# Patient Record
Sex: MALE | HISPANIC OR LATINO | Employment: FULL TIME | URBAN - METROPOLITAN AREA
[De-identification: names, ages, dates, MRNs, and addresses within clinical notes are randomized per-mention and may not be internally consistent; named-entity substitution may affect disease eponyms.]

---

## 2018-02-26 ENCOUNTER — APPOINTMENT (OUTPATIENT)
Dept: RADIOLOGY | Facility: MEDICAL CENTER | Age: 43
End: 2018-02-26
Attending: EMERGENCY MEDICINE
Payer: COMMERCIAL

## 2018-02-26 ENCOUNTER — RESOLUTE PROFESSIONAL BILLING HOSPITAL PROF FEE (OUTPATIENT)
Dept: HOSPITALIST | Facility: MEDICAL CENTER | Age: 43
End: 2018-02-26
Payer: COMMERCIAL

## 2018-02-26 ENCOUNTER — HOSPITAL ENCOUNTER (OUTPATIENT)
Facility: MEDICAL CENTER | Age: 43
End: 2018-02-27
Attending: EMERGENCY MEDICINE | Admitting: HOSPITALIST
Payer: COMMERCIAL

## 2018-02-26 DIAGNOSIS — I47.10 SVT (SUPRAVENTRICULAR TACHYCARDIA): ICD-10-CM

## 2018-02-26 DIAGNOSIS — R00.0 SINUS TACHYCARDIA: ICD-10-CM

## 2018-02-26 PROBLEM — R79.89 ELEVATED BRAIN NATRIURETIC PEPTIDE (BNP) LEVEL: Status: ACTIVE | Noted: 2018-02-26

## 2018-02-26 PROBLEM — R73.9 HYPERGLYCEMIA: Status: ACTIVE | Noted: 2018-02-26

## 2018-02-26 LAB
ALBUMIN SERPL BCP-MCNC: 4.8 G/DL (ref 3.2–4.9)
ALBUMIN/GLOB SERPL: 1.8 G/DL
ALP SERPL-CCNC: 51 U/L (ref 30–99)
ALT SERPL-CCNC: 33 U/L (ref 2–50)
ANION GAP SERPL CALC-SCNC: 12 MMOL/L (ref 0–11.9)
APTT PPP: 29.9 SEC (ref 24.7–36)
AST SERPL-CCNC: 22 U/L (ref 12–45)
BASOPHILS # BLD AUTO: 0.9 % (ref 0–1.8)
BASOPHILS # BLD: 0.11 K/UL (ref 0–0.12)
BILIRUB SERPL-MCNC: 0.8 MG/DL (ref 0.1–1.5)
BNP SERPL-MCNC: 275 PG/ML (ref 0–100)
BUN SERPL-MCNC: 11 MG/DL (ref 8–22)
CALCIUM SERPL-MCNC: 9.4 MG/DL (ref 8.5–10.5)
CHLORIDE SERPL-SCNC: 106 MMOL/L (ref 96–112)
CO2 SERPL-SCNC: 22 MMOL/L (ref 20–33)
CREAT SERPL-MCNC: 0.98 MG/DL (ref 0.5–1.4)
DEPRECATED D DIMER PPP IA-ACNC: <200 NG/ML(D-DU)
DIGOXIN SERPL-MCNC: 1 NG/ML (ref 0.8–2)
EKG IMPRESSION: NORMAL
EKG IMPRESSION: NORMAL
EOSINOPHIL # BLD AUTO: 0.11 K/UL (ref 0–0.51)
EOSINOPHIL NFR BLD: 0.9 % (ref 0–6.9)
ERYTHROCYTE [DISTWIDTH] IN BLOOD BY AUTOMATED COUNT: 41.9 FL (ref 35.9–50)
GLOBULIN SER CALC-MCNC: 2.7 G/DL (ref 1.9–3.5)
GLUCOSE SERPL-MCNC: 140 MG/DL (ref 65–99)
HCT VFR BLD AUTO: 53.1 % (ref 42–52)
HGB BLD-MCNC: 17.7 G/DL (ref 14–18)
INR PPP: 1.01 (ref 0.87–1.13)
LIPASE SERPL-CCNC: 63 U/L (ref 11–82)
LYMPHOCYTES # BLD AUTO: 7.47 K/UL (ref 1–4.8)
LYMPHOCYTES NFR BLD: 58.8 % (ref 22–41)
MANUAL DIFF BLD: NORMAL
MCH RBC QN AUTO: 30.1 PG (ref 27–33)
MCHC RBC AUTO-ENTMCNC: 33.3 G/DL (ref 33.7–35.3)
MCV RBC AUTO: 90.3 FL (ref 81.4–97.8)
MONOCYTES # BLD AUTO: 0.44 K/UL (ref 0–0.85)
MONOCYTES NFR BLD AUTO: 3.5 % (ref 0–13.4)
MORPHOLOGY BLD-IMP: NORMAL
NEUTROPHILS # BLD AUTO: 4.56 K/UL (ref 1.82–7.42)
NEUTROPHILS NFR BLD: 35.9 % (ref 44–72)
NRBC # BLD AUTO: 0 K/UL
NRBC BLD-RTO: 0 /100 WBC
PLATELET # BLD AUTO: 434 K/UL (ref 164–446)
PLATELET BLD QL SMEAR: NORMAL
PMV BLD AUTO: 9.7 FL (ref 9–12.9)
POTASSIUM SERPL-SCNC: 4.2 MMOL/L (ref 3.6–5.5)
PROT SERPL-MCNC: 7.5 G/DL (ref 6–8.2)
PROTHROMBIN TIME: 13 SEC (ref 12–14.6)
RBC # BLD AUTO: 5.88 M/UL (ref 4.7–6.1)
RBC BLD AUTO: NORMAL
SODIUM SERPL-SCNC: 140 MMOL/L (ref 135–145)
T4 FREE SERPL-MCNC: 1.11 NG/DL (ref 0.53–1.43)
TROPONIN I SERPL-MCNC: <0.01 NG/ML (ref 0–0.04)
TSH SERPL DL<=0.005 MIU/L-ACNC: 2.8 UIU/ML (ref 0.38–5.33)
WBC # BLD AUTO: 12.7 K/UL (ref 4.8–10.8)

## 2018-02-26 PROCEDURE — A9270 NON-COVERED ITEM OR SERVICE: HCPCS | Performed by: HOSPITALIST

## 2018-02-26 PROCEDURE — 80053 COMPREHEN METABOLIC PANEL: CPT

## 2018-02-26 PROCEDURE — 85027 COMPLETE CBC AUTOMATED: CPT

## 2018-02-26 PROCEDURE — 700102 HCHG RX REV CODE 250 W/ 637 OVERRIDE(OP): Performed by: HOSPITALIST

## 2018-02-26 PROCEDURE — 99285 EMERGENCY DEPT VISIT HI MDM: CPT

## 2018-02-26 PROCEDURE — 85379 FIBRIN DEGRADATION QUANT: CPT

## 2018-02-26 PROCEDURE — 85007 BL SMEAR W/DIFF WBC COUNT: CPT

## 2018-02-26 PROCEDURE — 85610 PROTHROMBIN TIME: CPT

## 2018-02-26 PROCEDURE — 96372 THER/PROPH/DIAG INJ SC/IM: CPT

## 2018-02-26 PROCEDURE — G0378 HOSPITAL OBSERVATION PER HR: HCPCS

## 2018-02-26 PROCEDURE — 85730 THROMBOPLASTIN TIME PARTIAL: CPT

## 2018-02-26 PROCEDURE — 83690 ASSAY OF LIPASE: CPT

## 2018-02-26 PROCEDURE — 93005 ELECTROCARDIOGRAM TRACING: CPT | Performed by: EMERGENCY MEDICINE

## 2018-02-26 PROCEDURE — 700101 HCHG RX REV CODE 250

## 2018-02-26 PROCEDURE — 71045 X-RAY EXAM CHEST 1 VIEW: CPT

## 2018-02-26 PROCEDURE — 93005 ELECTROCARDIOGRAM TRACING: CPT

## 2018-02-26 PROCEDURE — 96375 TX/PRO/DX INJ NEW DRUG ADDON: CPT

## 2018-02-26 PROCEDURE — 80162 ASSAY OF DIGOXIN TOTAL: CPT

## 2018-02-26 PROCEDURE — 700111 HCHG RX REV CODE 636 W/ 250 OVERRIDE (IP): Performed by: EMERGENCY MEDICINE

## 2018-02-26 PROCEDURE — 84484 ASSAY OF TROPONIN QUANT: CPT

## 2018-02-26 PROCEDURE — 96374 THER/PROPH/DIAG INJ IV PUSH: CPT

## 2018-02-26 PROCEDURE — 83880 ASSAY OF NATRIURETIC PEPTIDE: CPT

## 2018-02-26 PROCEDURE — 700111 HCHG RX REV CODE 636 W/ 250 OVERRIDE (IP): Performed by: HOSPITALIST

## 2018-02-26 PROCEDURE — 36415 COLL VENOUS BLD VENIPUNCTURE: CPT

## 2018-02-26 PROCEDURE — 84439 ASSAY OF FREE THYROXINE: CPT

## 2018-02-26 PROCEDURE — 700105 HCHG RX REV CODE 258: Performed by: EMERGENCY MEDICINE

## 2018-02-26 PROCEDURE — 84443 ASSAY THYROID STIM HORMONE: CPT

## 2018-02-26 PROCEDURE — 700111 HCHG RX REV CODE 636 W/ 250 OVERRIDE (IP)

## 2018-02-26 PROCEDURE — 99220 PR INITIAL OBSERVATION CARE,LEVL III: CPT | Performed by: HOSPITALIST

## 2018-02-26 RX ORDER — ONDANSETRON 4 MG/1
4 TABLET, ORALLY DISINTEGRATING ORAL EVERY 4 HOURS PRN
Status: DISCONTINUED | OUTPATIENT
Start: 2018-02-26 | End: 2018-02-27 | Stop reason: HOSPADM

## 2018-02-26 RX ORDER — DIGOXIN 125 MCG
250 TABLET ORAL DAILY
Status: ON HOLD | COMMUNITY
End: 2018-02-27

## 2018-02-26 RX ORDER — ADENOSINE 3 MG/ML
12 INJECTION, SOLUTION INTRAVENOUS ONCE
Status: COMPLETED | OUTPATIENT
Start: 2018-02-26 | End: 2018-02-26

## 2018-02-26 RX ORDER — ONDANSETRON 2 MG/ML
4 INJECTION INTRAMUSCULAR; INTRAVENOUS EVERY 4 HOURS PRN
Status: DISCONTINUED | OUTPATIENT
Start: 2018-02-26 | End: 2018-02-27 | Stop reason: HOSPADM

## 2018-02-26 RX ORDER — ADENOSINE 3 MG/ML
6 INJECTION, SOLUTION INTRAVENOUS ONCE
Status: COMPLETED | OUTPATIENT
Start: 2018-02-26 | End: 2018-02-26

## 2018-02-26 RX ORDER — POLYETHYLENE GLYCOL 3350 17 G/17G
1 POWDER, FOR SOLUTION ORAL
Status: DISCONTINUED | OUTPATIENT
Start: 2018-02-26 | End: 2018-02-27 | Stop reason: HOSPADM

## 2018-02-26 RX ORDER — METOPROLOL TARTRATE 1 MG/ML
INJECTION, SOLUTION INTRAVENOUS
Status: COMPLETED
Start: 2018-02-26 | End: 2018-02-26

## 2018-02-26 RX ORDER — ADENOSINE 3 MG/ML
INJECTION, SOLUTION INTRAVENOUS
Status: COMPLETED
Start: 2018-02-26 | End: 2018-02-26

## 2018-02-26 RX ORDER — BISACODYL 10 MG
10 SUPPOSITORY, RECTAL RECTAL
Status: DISCONTINUED | OUTPATIENT
Start: 2018-02-26 | End: 2018-02-27 | Stop reason: HOSPADM

## 2018-02-26 RX ORDER — PROMETHAZINE HYDROCHLORIDE 25 MG/1
12.5-25 SUPPOSITORY RECTAL EVERY 4 HOURS PRN
Status: DISCONTINUED | OUTPATIENT
Start: 2018-02-26 | End: 2018-02-27 | Stop reason: HOSPADM

## 2018-02-26 RX ORDER — ROSUVASTATIN CALCIUM 20 MG/1
20 TABLET, COATED ORAL EVERY EVENING
Status: DISCONTINUED | OUTPATIENT
Start: 2018-02-26 | End: 2018-02-26

## 2018-02-26 RX ORDER — PROMETHAZINE HYDROCHLORIDE 25 MG/1
12.5-25 TABLET ORAL EVERY 4 HOURS PRN
Status: DISCONTINUED | OUTPATIENT
Start: 2018-02-26 | End: 2018-02-27 | Stop reason: HOSPADM

## 2018-02-26 RX ORDER — ASPIRIN 325 MG
325 TABLET ORAL DAILY
Status: DISCONTINUED | OUTPATIENT
Start: 2018-02-27 | End: 2018-02-27 | Stop reason: HOSPADM

## 2018-02-26 RX ORDER — ACETAMINOPHEN 325 MG/1
650 TABLET ORAL EVERY 6 HOURS PRN
Status: DISCONTINUED | OUTPATIENT
Start: 2018-02-26 | End: 2018-02-27 | Stop reason: HOSPADM

## 2018-02-26 RX ORDER — SODIUM CHLORIDE 9 MG/ML
1000 INJECTION, SOLUTION INTRAVENOUS ONCE
Status: COMPLETED | OUTPATIENT
Start: 2018-02-26 | End: 2018-02-26

## 2018-02-26 RX ORDER — METOPROLOL TARTRATE 1 MG/ML
5 INJECTION, SOLUTION INTRAVENOUS ONCE
Status: COMPLETED | OUTPATIENT
Start: 2018-02-26 | End: 2018-02-26

## 2018-02-26 RX ORDER — CHLORAL HYDRATE 500 MG
1000 CAPSULE ORAL DAILY
Status: DISCONTINUED | OUTPATIENT
Start: 2018-02-26 | End: 2018-02-27 | Stop reason: HOSPADM

## 2018-02-26 RX ORDER — AMOXICILLIN 250 MG
2 CAPSULE ORAL 2 TIMES DAILY
Status: DISCONTINUED | OUTPATIENT
Start: 2018-02-26 | End: 2018-02-27 | Stop reason: HOSPADM

## 2018-02-26 RX ORDER — METOPROLOL TARTRATE 1 MG/ML
5 INJECTION, SOLUTION INTRAVENOUS ONCE
Status: ACTIVE | OUTPATIENT
Start: 2018-02-26 | End: 2018-02-27

## 2018-02-26 RX ADMIN — METOPROLOL TARTRATE 5 MG: 1 INJECTION, SOLUTION INTRAVENOUS at 09:43

## 2018-02-26 RX ADMIN — ADENOSINE 12 MG: 3 INJECTION, SOLUTION INTRAVENOUS at 09:35

## 2018-02-26 RX ADMIN — NICOTINE POLACRILEX 2 MG: 2 GUM, CHEWING BUCCAL at 18:42

## 2018-02-26 RX ADMIN — ENOXAPARIN SODIUM 40 MG: 100 INJECTION SUBCUTANEOUS at 16:23

## 2018-02-26 RX ADMIN — SODIUM CHLORIDE 1000 ML: 9 INJECTION, SOLUTION INTRAVENOUS at 10:15

## 2018-02-26 RX ADMIN — ADENOSINE 6 MG: 3 INJECTION, SOLUTION INTRAVENOUS at 09:34

## 2018-02-26 RX ADMIN — METOPROLOL TARTRATE 5 MG: 5 INJECTION INTRAVENOUS at 09:43

## 2018-02-26 RX ADMIN — METOPROLOL TARTRATE 25 MG: 25 TABLET, FILM COATED ORAL at 16:23

## 2018-02-26 ASSESSMENT — COGNITIVE AND FUNCTIONAL STATUS - GENERAL
SUGGESTED CMS G CODE MODIFIER DAILY ACTIVITY: CH
MOBILITY SCORE: 24
SUGGESTED CMS G CODE MODIFIER MOBILITY: CH
DAILY ACTIVITIY SCORE: 24

## 2018-02-26 ASSESSMENT — ENCOUNTER SYMPTOMS
DIZZINESS: 0
CHILLS: 0
SPUTUM PRODUCTION: 0
NAUSEA: 0
BACK PAIN: 0
HEMOPTYSIS: 0
COUGH: 0
NECK PAIN: 0
VOMITING: 0

## 2018-02-26 ASSESSMENT — LIFESTYLE VARIABLES
HAVE PEOPLE ANNOYED YOU BY CRITICIZING YOUR DRINKING: NO
EVER HAD A DRINK FIRST THING IN THE MORNING TO STEADY YOUR NERVES TO GET RID OF A HANGOVER: NO
TOTAL SCORE: 0
CONSUMPTION TOTAL: POSITIVE
EVER_SMOKED: YES
HOW MANY TIMES IN THE PAST YEAR HAVE YOU HAD 5 OR MORE DRINKS IN A DAY: 20
TOTAL SCORE: 0
DO YOU DRINK ALCOHOL: NO
ALCOHOL_USE: YES
HAVE YOU EVER FELT YOU SHOULD CUT DOWN ON YOUR DRINKING: NO
AVERAGE NUMBER OF DAYS PER WEEK YOU HAVE A DRINK CONTAINING ALCOHOL: 2
ON A TYPICAL DAY WHEN YOU DRINK ALCOHOL HOW MANY DRINKS DO YOU HAVE: 6
TOTAL SCORE: 0
EVER FELT BAD OR GUILTY ABOUT YOUR DRINKING: NO

## 2018-02-26 ASSESSMENT — PATIENT HEALTH QUESTIONNAIRE - PHQ9
SUM OF ALL RESPONSES TO PHQ QUESTIONS 1-9: 0
1. LITTLE INTEREST OR PLEASURE IN DOING THINGS: NOT AT ALL
2. FEELING DOWN, DEPRESSED, IRRITABLE, OR HOPELESS: NOT AT ALL
SUM OF ALL RESPONSES TO PHQ9 QUESTIONS 1 AND 2: 0

## 2018-02-26 ASSESSMENT — PAIN SCALES - GENERAL
PAINLEVEL_OUTOF10: 0
PAINLEVEL_OUTOF10: 0

## 2018-02-26 NOTE — H&P
Hospital Medicine History and Physical    Date of Service  2/26/2018    Chief Complaint  Chief Complaint   Patient presents with   • Rapid Heart Beat       History of Presenting Illness  43 y.o. male who presented 2/26/2018 with palpitations.    For several years the patient's had a history of intermittent palpitations and she trips the emergency room for SVT. He has been converted in more than one way including electrocardioversion patient follows with Dr. Leon at [facility] for SVT. Patient's been prescribed digoxin his medication when he feels symptoms of palpitations, typically at work a few minutes and he has no further issues. Over the past week patient has been taking the medication almost daily for symptoms in the 36 hours prior to admission he took a few doses with no improvement. Patient arrived to the emergency room with SVT, rates to the 240s, he received 6 mg of adenosine report is that the rate slowed but he was still in her posterior hernia which gradually trended up. Patient received 12 mg of adenosine with no change. He did then get 5 mg metoprolol converted to sinus tachycardia with rate of approximately 125.  . Regarding patient was feeling short of breath with palpitations and feeling a bit dizzy. He now feels totally normal no palpitations no dizziness no shortness of breath orthopnea. He believes his heart rate is probably near 120 most times after cardioversion.    Primary Care Physician  Pcp Unknown    Consultants  Cardiology    Code Status  Full code    Review of Systems  Review of Systems   Constitutional: Negative for chills and malaise/fatigue.   Respiratory: Negative for cough, hemoptysis and sputum production.    Gastrointestinal: Negative for nausea and vomiting.   Musculoskeletal: Negative for back pain, joint pain and neck pain.   Skin: Negative for itching and rash.   Neurological: Negative for dizziness.   All other systems reviewed and are negative.       Past Medical  History  Past Medical History:   Diagnosis Date   • Hyperlipidemia    • Paroxysmal atrial fibrillation (CMS-HCC)        Surgical History  No past surgical history on file.    Medications  No current facility-administered medications on file prior to encounter.      Current Outpatient Prescriptions on File Prior to Encounter   Medication Sig Dispense Refill   • docosahexanoic acid (OMEGA 3 FA) 1000 MG CAPS Take 1,000 mg by mouth every day.         Family History  Family History   Problem Relation Age of Onset   • Heart Disease Maternal Grandmother        Social History  Social History   Substance Use Topics   • Smoking status: Former Smoker     Years: 6.00     Quit date: 2006   • Smokeless tobacco: Not on file   • Alcohol use 2.5 oz/week     5 Cans of beer per week       Allergies  No Known Allergies     Physical Exam  Laboratory   Hemodynamics  Temp (24hrs), Av.1 °C (96.9 °F), Min:36.1 °C (96.9 °F), Max:36.1 °C (96.9 °F)   Temperature: 36.1 °C (96.9 °F)  Pulse  Av.4  Min: 116  Max: 230 Heart Rate (Monitored): (!) 130  Blood Pressure: 122/84, NIBP: 107/88      Respiratory      Respiration: 14, Pulse Oximetry: 98 %             Physical Exam   Constitutional: He is oriented to person, place, and time. He appears well-developed and well-nourished.   Eyes: Conjunctivae and EOM are normal. Right eye exhibits no discharge. Left eye exhibits no discharge.   Neck: Neck supple. No tracheal deviation present. No thyromegaly present.   Cardiovascular: Regular rhythm and intact distal pulses.    No murmur heard.  Tachycardic   Pulmonary/Chest: Effort normal and breath sounds normal. No respiratory distress. He has no wheezes.   Abdominal: Soft. Bowel sounds are normal. He exhibits no distension. There is no tenderness. There is no rebound.   Musculoskeletal: Normal range of motion. He exhibits no edema.   Neurological: He is alert and oriented to person, place, and time. No cranial nerve deficit.   Skin: Skin is  warm and dry. He is not diaphoretic. No erythema.   Psychiatric: He has a normal mood and affect.       Recent Labs      02/26/18   0937   WBC  12.7*   RBC  5.88   HEMOGLOBIN  17.7   HEMATOCRIT  53.1*   MCV  90.3   MCH  30.1   MCHC  33.3*   RDW  41.9   PLATELETCT  434   MPV  9.7     Recent Labs      02/26/18   0937   SODIUM  140   POTASSIUM  4.2   CHLORIDE  106   CO2  22   GLUCOSE  140*   BUN  11   CREATININE  0.98   CALCIUM  9.4     Recent Labs      02/26/18   0937   ALTSGPT  33   ASTSGOT  22   ALKPHOSPHAT  51   TBILIRUBIN  0.8   LIPASE  63   GLUCOSE  140*     Recent Labs      02/26/18   0937   APTT  29.9   INR  1.01     Recent Labs      02/26/18   0937   BNPBTYPENAT  275*         Lab Results   Component Value Date    TROPONINI <0.01 02/26/2018     Urinalysis:  No results found for: SPECGRAVITY, GLUCOSEUR, KETONES, NITRITE, WBCURINE, RBCURINE, BACTERIA, EPITHELCELL     Imaging  CXR:  FINDINGS:  There is no evidence of focal infiltrate or pulmonary edema.  The heart is normal in size.  There is no pleural effusion.  Bony structures and soft tissues are unremarkable.   Assessment/Plan     I anticipate this patient is appropriate for observation status at this time.    * SVT (supraventricular tachycardia) (CMS-HCC)- (present on admission)   Assessment & Plan    Recurrent SVT  Followed by Dr. Leon  Patient uses PRN digoxin which is typically effective, didn't work this time  Responded to IV metoprolol  Start PO metoprolol          Elevated brain natriuretic peptide (BNP) level- (present on admission)   Assessment & Plan    He is a little short of breath  Check echo  Hold off on diuresis, suspect this is rate related        Hyperglycemia- (present on admission)   Assessment & Plan    Check HbA1c            VTE prophylaxis: lovenox

## 2018-02-26 NOTE — ED PROVIDER NOTES
"ED Provider Note    CHIEF COMPLAINT  Chief Complaint   Patient presents with   • Rapid Heart Beat       HPI  Nicholas Boykin is a 43 y.o. male who presents for evaluation of rapid heartbeat. I was called emergently into room 10 by the nurse to see the patient for a heart rate of 250 bpm. Patient has a history of paroxysmal atrial fibrillation. Approximately 36 hours ago he had onset of a rapid heart rate. Dr. Foote is his cardiologist. He has a prescription for digoxin which he was instructed to take when he has these episodes. He states usually it works in [facility] but this time it has not worked. He is taken multiple doses of digoxin. He denies any chest pain. He does have some mild shortness of breath. He's had no fevers or chills. He's had no cough or sputum production.    REVIEW OF SYSTEMS  See HPI for further details. All other systems are negative.     PAST MEDICAL HISTORY  Past Medical History:   Diagnosis Date   • Hyperlipidemia    • Paroxysmal atrial fibrillation        FAMILY HISTORY  Family History   Problem Relation Age of Onset   • Heart Disease Maternal Grandmother        SOCIAL HISTORY  Social History     Social History   • Marital status:      Spouse name: N/A   • Number of children: N/A   • Years of education: N/A     Social History Main Topics   • Smoking status: Former Smoker     Years: 6.00     Quit date: 1/1/2006   • Smokeless tobacco: Not on file   • Alcohol use 2.5 oz/week     5 Cans of beer per week   • Drug use: Unknown   • Sexual activity: Not on file     Other Topics Concern   • Not on file     Social History Narrative   • No narrative on file       SURGICAL HISTORY  No past surgical history on file.    CURRENT MEDICATIONS  Home Medications    **Home medications have not yet been reviewed for this encounter**         ALLERGIES  No Known Allergies    PHYSICAL EXAM  VITAL SIGNS: /84   Pulse (!) 127   Temp 36.1 °C (96.9 °F)   Resp (!) 11   Ht 1.727 m (5' 8\")   Wt " 95.3 kg (210 lb)   SpO2 100%   BMI 31.93 kg/m²     Constitutional: Well developed, Well nourished, No acute distress, Non-toxic appearance.   HENT: Normocephalic, Atraumatic.   Eyes:  EOMI, Conjunctiva normal, No discharge.   Cardiovascular: Extreme tachycardia, Normal rhythm, No murmurs, No rubs, No gallops.   Thorax & Lungs: Lungs clear to auscultation bilaterally without wheezes, rales or rhonchi. No respiratory distress.    Abdomen: Soft and nontender.  Skin: Warm, Dry.   Musculoskeletal: Good range of motion in all major joints.  Neurologic: Awake alert.    EKG  EKG Interpretation #1    Interpreted by emergency department physician    Rhythm: paroxismal supraventricular tachycardia  Rate: 244 bpm  Axis: normal  Ectopy: none  Conduction: normal  ST Segments: no acute change  T Waves: no acute change  Q Waves: none    Clinical Impression: supraventricular tachycardia    EKG Interpretation #2    Interpreted by emergency department physician    Rhythm: sinus tachycardia  Rate: 120-130  Axis: normal  Ectopy: none  Conduction: normal  ST Segments: no acute change  T Waves: no acute change  Q Waves: none    Clinical Impression: sinus tachycardia        RADIOLOGY/PROCEDURES  DX-CHEST-LIMITED (1 VIEW)   Final Result      No evidence of acute cardiopulmonary process.            COURSE & MEDICAL DECISION MAKING  Pertinent Labs & Imaging studies reviewed. (See chart for details)  This is a 43-year-old who is here for evaluation of a rapid heartbeat. I was called emergently into room 10 to evaluate this patient. His EKG on presentation shows a regular narrow complex tachycardia at 244 bpm. The patient evidently has a history of paroxysmal atrial fibrillation. This EKG looks like a supraventricular tachycardia. He had no chest pain and only had some very minimal shortness of breath. His symptoms had started approximately 36 hours ago. An IV is established and is treated with 6 mg of adenosine. This results in a very brief  slowing of the rate. It continued to show a narrow complex regular tachycardia. He then went back up to his of approximately 250 on presentation. He's given 12 mg of adenosine with no change at all. At that point I was going to treat the patient with diltiazem however there is a shortage and we have no IV diltiazem in the hospital. For that reason he is treated with 5 mg metoprolol IV. This results in conversion to a sinus tachycardia at a rate of approximately 125 bpm. Chest x-ray shows no evidence of any acute cardiopulmonary process. I discussed the case with Dr. Lagunas of cardiology. She states if the patient has a negative laboratory and chest x-ray workup he should be able to be discharged home and follow-up with his cardiologist Dr. Foote. Laboratory workup is essentially normal. I was concerned he was having a persistent sinus tachycardia therefore thyroid studies have been ordered which are normal. D-dimer is negative suggesting no pulmonary embolism. BNP is elevated at 275 but he has no evidence of overt failure. His CBC shows a white count of 12 with a differential of 35 polys and 58 lymphocytes. He is not anemic. His troponin I is negative. As mentioned previously it sounds like he takes digitoxin when necessary for these episodes of paroxysmal atrial fibrillation. His level today is actually therapeutic at 1.0. Chemistries are normal with the exception of a glucose of 140 and a nonfasting study. Upon repeat evaluation the patient is resting comfortably and states he feels fine. He continues to have a persistent resting sinus tachycardia 125 bpm. I discussed results of all the tests with the patient. I've explained to him that I have no explanation for his persistent tachycardia and that he's had a prior hospitalization for further evaluation. I discussed the case with Dr. Herrera of the hospitalist service and she will be the primary admitting physician.    FINAL IMPRESSION  1. Paroxysmal  Supraventricular tachycardia  2. Sinus tachycardia  3. Patient required 35 minutes of critical care time         Electronically signed by: Thierry Patel, 2/26/2018 10:29 AM

## 2018-02-26 NOTE — ASSESSMENT & PLAN NOTE
Recurrent SVT  Followed by Dr. Leon  Patient uses PRN digoxin which is typically effective, didn't work this time  Responded to IV metoprolol  Start PO metoprolol

## 2018-02-26 NOTE — LETTER
"  FORM C-4:  EMPLOYEE’S CLAIM FOR COMPENSATION/ REPORT OF INITIAL TREATMENT  EMPLOYEE’S CLAIM - PROVIDE ALL INFORMATION REQUESTED   First Name  Nicholas Last Name  Ashutosh Birthdate             Age  1975 43 y.o. Sex  male Claim Number   Home Employee Address  [address]   [facility]                                     Zip  [address] Height  1.727 m (5' 8\") Weight  95.3 kg (210 lb) [facility]     Mailing Employee Address                           [address]    [facility]               Zip  [address] Telephone  [phone] (home)  Primary Language Spoken  ENGLISH   Insurer  UNABLE TO OBTAIN Third Party   JON CLAIMS [employer] Employee's Occupation (Job Title) When Injury or Occupational Disease Occurred     Employer's Name  [employer] [place] Telephone  [phone]    Employer Address  [address] [facility] [address]] Zip  [address]   Date of Injury  2/26/2018       Hour of Injury  8:00 AM Date Employer Notified  2/26/2018 Last Day of Work after Injury or Occupational Disease  2/26/2018 Supervisor to Whom Injury Reported  Javy   Address or Location of Accident (if applicable)  [[facility]]   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 Be specific and answer in detail. Use additional sheet if necessary)  had shortness of breath, and real bad palpitations   If you believe that you have an occupational disease, when did you first have knowledge of the disability and it relationship to your employment?  n/a Witnesses to the Accident  none     Nature of Injury or Occupational Disease  Workers' Compensation  Part(s) of Body Injured or Affected  Soft Tissue, Chest, N/A    I certify that the above is true and correct to the best of my knowledge and that I have provided this information in order to obtain the benefits of [place] " [place]  I hereby authorize any physician, chiropractor, surgeon, practitioner, or other person, any hospital, including [facility] or [facility] hospital, any medical service organization, any insurance company, or other institution or organization to release to each other, any medical or other information, including benefits paid or payable, pertinent to this injury or disease, except information relative to diagnosis, treatment and/or counseling for AIDS, psychological conditions, alcohol or controlled substances, for which I must give specific authorization.  A Photostat of this authorization shall be as valid as the original.   Date Place   Employee’s Signature   THIS REPORT MUST BE COMPLETED AND MAILED WITHIN 3 WORKING DAYS OF TREATMENT   Place  [facility], EMERGENCY DEPT  Name of Facility   [facility]   Date  2/26/2018 Diagnosis  (I47.1) SVT (supraventricular tachycardia) (CMS-[facility])  (R00.0) Sinus tachycardia Is there evidence the injured employee was under the influence of alcohol and/or another controlled substance at the time of accident?   Hour  5:17 PM Description of Injury or Disease  SVT (supraventricular tachycardia) (CMS-[facility])  Sinus tachycardia     Treatment     Have you advised the patient to remain off work five days or more?             X-Ray Findings      If Yes   From Date    To Date      From information given by the employee, together with medical evidence, can you directly connect this injury or occupational disease as job incurred?    If No, is the employee capable of: Full Duty    Modified Duty      Is additional medical care by a physician indicated?    If Modified Duty, Specify any Limitations / Restrictions        Do you know of any previous injury or disease contributing to this condition or occupational disease?      Date  2/26/2018 Print Doctor’s  "Name  Thierry Patel I certify the employer’s copy of this form was mailed on:   Address  [address] [address] [phone] Insurer’s Use Only   [facility]  [address]    Provider’s Tax ID Number    Telephone  Dept: [phone]    Doctor’s Signature    Degree       Original - TREATING PHYSICIAN OR CHIROPRACTOR   Pg 2-Insurer/TPA   Pg 3-Employer   Pg 4-Employee                                                                                                  Form C-4 (rev01/03)     BRIEF DESCRIPTION OF RIGHTS AND BENEFITS  (Pursuant to [place] 616[place])    Notice of Injury or Occupational Disease (Incident Report Form C-1): If an injury or occupational disease (OD) arises out of and in the course of employment, you must provide written notice to your employer as soon as practicable, but no later than 7 days after the accident or OD. Your employer shall maintain a sufficient supply of the required forms.    Claim for Compensation (Form C-4): If medical treatment is sought, the form C-4 is available at the place of initial treatment. A completed \"Claim for Compensation\" (Form C-4) must be filed within 90 days after an accident or OD. The treating physician or chiropractor must, within 3 working days after treatment, complete and mail to the employer, the employer's insurer and third-party , the Claim for Compensation.    Medical Treatment: If you require medical treatment for your on-the-job injury or OD, you may be required to select a physician or chiropractor from a list provided by your workers’ compensation insurer, if it has contracted with an Organization for Managed Care (MCO) or Preferred Provider Organization (PPO) or providers of health care. If your employer has not entered into a contract with an MCO or PPO, you may select a physician or chiropractor from the Panel of Physicians and Chiropractors. Any medical costs related to your industrial injury or OD will " be paid by your insurer.    Temporary Total Disability (TTD): If your doctor has certified that you are unable to work for a period of at least 5 consecutive days, or 5 cumulative days in a 20-day period, or places restrictions on you that your employer does not accommodate, you may be entitled to TTD compensation.    Temporary Partial Disability (TPD): If the wage you receive upon reemployment is less than the compensation for TTD to which you are entitled, the insurer may be required to pay you TPD compensation to make up the difference. TPD can only be paid for a maximum of 24 months.    Permanent Partial Disability (PPD): When your medical condition is stable and there is an indication of a PPD as a result of your injury or OD, within 30 days, your insurer must arrange for an evaluation by a rating physician or chiropractor to determine the degree of your PPD. The amount of your PPD award depends on the date of injury, the results of the PPD evaluation and your age and wage.    Permanent Total Disability (PTD): If you are medically certified by a treating physician or chiropractor as permanently and totally disabled and have been granted a PTD status by your insurer, you are entitled to receive monthly benefits not to exceed 66 2/3% of your average monthly wage. The amount of your PTD payments is subject to reduction if you previously received a PPD award.    Vocational Rehabilitation Services: You may be eligible for vocational rehabilitation services if you are unable to return to the job due to a permanent physical impairment or permanent restrictions as a result of your injury or occupational disease.    Transportation and Per Jerad Reimbursement: You may be eligible for travel expenses and per jerad associated with medical treatment.  Reopening: You may be able to reopen your claim if your condition worsens after claim closure.    Appeal Process: If you disagree with a written determination issued by the  insurer or the insurer does not respond to your request, you may appeal to the Department of Administration, , by following the instructions contained in your determination letter. You must appeal the determination within 70 days from the date of the determination letter at [address], [address], [place], [place] [address], or [address], [address], [place], [place] [address]. If you disagree with the  decision, you may appeal to the Department of Administration, . You must file your appeal within 30 days from the date of the  decision letter at [address], [address], [place], [place] [address], or [address], [place], [place] [address]. If you disagree with a decision of an , you may file a petition for judicial review with the District Court. You must do so within 30 days of the Appeal Officer’s decision. You may be represented by an  at your own expense or you may contact the [facility] for possible representation.    [place]  for Injured Workers (NAIW): If you disagree with a  decision, you may request that NAIW represent you without charge at an  Hearing. For information regarding denial of benefits, you may contact the [facility] at: [address], [address], [place] [address], [phone], or [address], [place] [address], [phone]    To File a Complaint with the Division: If you wish to file a complaint with the  of the Division of Industrial Relations (DIR), please contact the Workers’ Compensation Section, [address], [place], [place] [address], telephone [phone], or [address], [place] [address], telephone [phone].    For assistance with Workers’ Compensation Issues: you may contact the Office of the Governor Consumer Health  Assistance, [address], [address], [place], [place] [address], Toll Free 1-728.967.9007, Web site: [email or web address]., E-mail [email or web address].                                                                                                                                                                               __________________________________________________________________                                    _________________            Employee Name / Signature                                                                                                                            Date                                       D-2 (rev. 10/07)

## 2018-02-26 NOTE — LETTER
"  FORM C-4:  EMPLOYEE’S CLAIM FOR COMPENSATION/ REPORT OF INITIAL TREATMENT  EMPLOYEE’S CLAIM - PROVIDE ALL INFORMATION REQUESTED   First Name  Nicholas Last Name  Ashutosh Birthdate             Age  1975 43 y.o. Sex  male Claim Number   Home Employee Address  [address]  [facility]                                     Zip  [address] Height  1.727 m (5' 8\") Weight  95.3 kg (210 lb) [facility]  [id number]   Mailing Employee Address                           [address]   [facility]               Zip  [address] Telephone  [phone] (home)  Primary Language Spoken  ENGLISH   Insurer  *** Third Party   JON CLAIMS MGMT Employee's Occupation (Job Title) When Injury or Occupational Disease Occurred     Employer's Name  [employer] [place] Telephone  [phone]    Employer Address  [address] [employer] [facility] [address]] Zip  [address]   Date of Injury  2/26/2018       Hour of Injury  8:00 AM Date Employer Notified  2/26/2018 Last Day of Work after Injury or Occupational Disease  2/26/2018 Supervisor to Whom Injury Reported  Javy   Address or Location of Accident (if applicable)  [[facility]]   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 Be specific and answer in detail. Use additional sheet if necessary)  had shortness of breath, and real bad palpitations   If you believe that you have an occupational disease, when did you first have knowledge of the disability and it relationship to your employment?  n/a Witnesses to the Accident  none     Nature of Injury or Occupational Disease  Workers' Compensation  Part(s) of Body Injured or Affected  Soft Tissue, Chest, N/A    I certify that the above is true and correct to the best of my knowledge and that I have provided this information in order to obtain the benefits of [place] and " [place]  I hereby authorize any physician, chiropractor, surgeon, practitioner, or other person, any hospital, including [facility] or [facility] hospital, any medical service organization, any insurance company, or other institution or organization to release to each other, any medical or other information, including benefits paid or payable, pertinent to this injury or disease, except information relative to diagnosis, treatment and/or counseling for AIDS, psychological conditions, alcohol or controlled substances, for which I must give specific authorization.  A Photostat of this authorization shall be as valid as the original.   Date Place   Employee’s Signature   THIS REPORT MUST BE COMPLETED AND MAILED WITHIN 3 WORKING DAYS OF TREATMENT   Place  [facility], EMERGENCY DEPT  Name of Facility   [facility]   Date  2/26/2018 Diagnosis  (I47.1) SVT (supraventricular tachycardia) ([facility])  (R00.0) Sinus tachycardia Is there evidence the injured employee was under the influence of alcohol and/or another controlled substance at the time of accident?   Hour  5:11 PM Description of Injury or Disease  SVT (supraventricular tachycardia) ([facility])  Sinus tachycardia     Treatment     Have you advised the patient to remain off work five days or more?             X-Ray Findings      If Yes   From Date    To Date      From information given by the employee, together with medical evidence, can you directly connect this injury or occupational disease as job incurred?    If No, is the employee capable of: Full Duty    Modified Duty      Is additional medical care by a physician indicated?    If Modified Duty, Specify any Limitations / Restrictions        Do you know of any previous injury or disease contributing to this condition or occupational disease?      Date  2/26/2018 Print Doctor’s Name  Jorge  "Thierry GRAY I certify the employer’s copy of this form was mailed on:   Address  [address]  [place] [address] [phone] Insurer’s Use Only   [facility]  [address]    Provider’s Tax ID Number    Telephone  Dept: [phone]    Doctor’s Signature    Degree       Original - TREATING PHYSICIAN OR CHIROPRACTOR   Pg 2-Insurer/TPA   Pg 3-Employer   Pg 4-Employee                                                                                                  Form C-4 (rev01/03)     BRIEF DESCRIPTION OF RIGHTS AND BENEFITS  (Pursuant to [place] 616[place])    Notice of Injury or Occupational Disease (Incident Report Form C-1): If an injury or occupational disease (OD) arises out of and in the course of employment, you must provide written notice to your employer as soon as practicable, but no later than 7 days after the accident or OD. Your employer shall maintain a sufficient supply of the required forms.    Claim for Compensation (Form C-4): If medical treatment is sought, the form C-4 is available at the place of initial treatment. A completed \"Claim for Compensation\" (Form C-4) must be filed within 90 days after an accident or OD. The treating physician or chiropractor must, within 3 working days after treatment, complete and mail to the employer, the employer's insurer and third-party , the Claim for Compensation.    Medical Treatment: If you require medical treatment for your on-the-job injury or OD, you may be required to select a physician or chiropractor from a list provided by your workers’ compensation insurer, if it has contracted with an Organization for Managed Care (MCO) or Preferred Provider Organization (PPO) or providers of health care. If your employer has not entered into a contract with an MCO or PPO, you may select a physician or chiropractor from the Panel of Physicians and Chiropractors. Any medical costs related to your industrial injury or OD will be paid by " your insurer.    Temporary Total Disability (TTD): If your doctor has certified that you are unable to work for a period of at least 5 consecutive days, or 5 cumulative days in a 20-day period, or places restrictions on you that your employer does not accommodate, you may be entitled to TTD compensation.    Temporary Partial Disability (TPD): If the wage you receive upon reemployment is less than the compensation for TTD to which you are entitled, the insurer may be required to pay you TPD compensation to make up the difference. TPD can only be paid for a maximum of 24 months.    Permanent Partial Disability (PPD): When your medical condition is stable and there is an indication of a PPD as a result of your injury or OD, within 30 days, your insurer must arrange for an evaluation by a rating physician or chiropractor to determine the degree of your PPD. The amount of your PPD award depends on the date of injury, the results of the PPD evaluation and your age and wage.    Permanent Total Disability (PTD): If you are medically certified by a treating physician or chiropractor as permanently and totally disabled and have been granted a PTD status by your insurer, you are entitled to receive monthly benefits not to exceed 66 2/3% of your average monthly wage. The amount of your PTD payments is subject to reduction if you previously received a PPD award.    Vocational Rehabilitation Services: You may be eligible for vocational rehabilitation services if you are unable to return to the job due to a permanent physical impairment or permanent restrictions as a result of your injury or occupational disease.    Transportation and Per Jerad Reimbursement: You may be eligible for travel expenses and per jerad associated with medical treatment.  Reopening: You may be able to reopen your claim if your condition worsens after claim closure.    Appeal Process: If you disagree with a written determination issued by the insurer or the  insurer does not respond to your request, you may appeal to the Department of Administration, , by following the instructions contained in your determination letter. You must appeal the determination within 70 days from the date of the determination letter at [address], [address], [place], [place] [address], or [address], [address], [place], [place] [address]. If you disagree with the  decision, you may appeal to the Department of Administration, . You must file your appeal within 30 days from the date of the  decision letter at [address], [address], [place], [place] [address], or [address], [address], [place], [place] [address]. If you disagree with a decision of an , you may file a petition for judicial review with the District Court. You must do so within 30 days of the Appeal Officer’s decision. You may be represented by an  at your own expense or you may contact the [facility] for possible representation.    [place]  for Injured Workers (NAIW): If you disagree with a  decision, you may request that NAIW represent you without charge at an  Hearing. For information regarding denial of benefits, you may contact the [facility] at: [address], [address], [place] [address], [phone], or [address], [address], [place] [address], [phone]    To File a Complaint with the Division: If you wish to file a complaint with the  of the Division of Industrial Relations (DIR), please contact the Workers’ Compensation Section, [address], [address], [place], [place] [address], telephone [phone], or [address] [place], [place] [address], telephone [phone].    For assistance with Workers’ Compensation Issues: you may contact the Office of the [facility] Consumer Health Assistance, [address], [address], [place], [place] [address], Toll Free 1-901.864.2788, Web site: [email or web address]., E-mail [email or web address].                                                                                                                                                                               __________________________________________________________________                                    _________________            Employee Name / Signature                                                                                                                            Date                                       D-2 (rev. 10/07)

## 2018-02-26 NOTE — ED NOTES
Pt reports on Saturday he had a sudden onset of SOB, hx of afib no relief with digoxin at home  Arrived to ER, straight back to room, pt placed on monitor and zoll defib pads, ERP to bedside, PIV access obtained, blood samples sent to lab, pt medicated as prescribed, no change after adenosine, converted to a sinus tachycardia after 5 mg metoprolol IV

## 2018-02-27 ENCOUNTER — PATIENT OUTREACH (OUTPATIENT)
Dept: HEALTH INFORMATION MANAGEMENT | Facility: OTHER | Age: 43
End: 2018-02-27

## 2018-02-27 VITALS
WEIGHT: 197.75 LBS | HEART RATE: 64 BPM | DIASTOLIC BLOOD PRESSURE: 73 MMHG | RESPIRATION RATE: 15 BRPM | TEMPERATURE: 98.4 F | OXYGEN SATURATION: 93 % | BODY MASS INDEX: 29.97 KG/M2 | SYSTOLIC BLOOD PRESSURE: 127 MMHG | HEIGHT: 68 IN

## 2018-02-27 PROBLEM — R73.9 HYPERGLYCEMIA: Status: RESOLVED | Noted: 2018-02-26 | Resolved: 2018-02-27

## 2018-02-27 PROBLEM — I47.10 SVT (SUPRAVENTRICULAR TACHYCARDIA) (HCC): Status: RESOLVED | Noted: 2018-02-26 | Resolved: 2018-02-27

## 2018-02-27 PROBLEM — G47.33 OSA (OBSTRUCTIVE SLEEP APNEA): Status: ACTIVE | Noted: 2018-02-27

## 2018-02-27 LAB
AMPHET UR QL SCN: NEGATIVE
ANION GAP SERPL CALC-SCNC: 11 MMOL/L (ref 0–11.9)
BARBITURATES UR QL SCN: NEGATIVE
BASOPHILS # BLD AUTO: 0.5 % (ref 0–1.8)
BASOPHILS # BLD: 0.06 K/UL (ref 0–0.12)
BENZODIAZ UR QL SCN: NEGATIVE
BNP SERPL-MCNC: 112 PG/ML (ref 0–100)
BUN SERPL-MCNC: 12 MG/DL (ref 8–22)
BZE UR QL SCN: NEGATIVE
CALCIUM SERPL-MCNC: 9.1 MG/DL (ref 8.5–10.5)
CANNABINOIDS UR QL SCN: NEGATIVE
CHLORIDE SERPL-SCNC: 105 MMOL/L (ref 96–112)
CO2 SERPL-SCNC: 23 MMOL/L (ref 20–33)
COMMENT 1642: NORMAL
CREAT SERPL-MCNC: 0.83 MG/DL (ref 0.5–1.4)
EKG IMPRESSION: NORMAL
EOSINOPHIL # BLD AUTO: 0.49 K/UL (ref 0–0.51)
EOSINOPHIL NFR BLD: 4.4 % (ref 0–6.9)
ERYTHROCYTE [DISTWIDTH] IN BLOOD BY AUTOMATED COUNT: 42 FL (ref 35.9–50)
EST. AVERAGE GLUCOSE BLD GHB EST-MCNC: 105 MG/DL
GLUCOSE SERPL-MCNC: 78 MG/DL (ref 65–99)
HBA1C MFR BLD: 5.3 % (ref 0–5.6)
HCT VFR BLD AUTO: 47.1 % (ref 42–52)
HGB BLD-MCNC: 15.4 G/DL (ref 14–18)
IMM GRANULOCYTES # BLD AUTO: 0.03 K/UL (ref 0–0.11)
IMM GRANULOCYTES NFR BLD AUTO: 0.3 % (ref 0–0.9)
LV EJECT FRACT  99904: 65
LV EJECT FRACT MOD 2C 99903: 68.33
LV EJECT FRACT MOD 4C 99902: 63.87
LV EJECT FRACT MOD BP 99901: 64.44
LYMPHOCYTES # BLD AUTO: 5.69 K/UL (ref 1–4.8)
LYMPHOCYTES NFR BLD: 51.5 % (ref 22–41)
MAGNESIUM SERPL-MCNC: 2 MG/DL (ref 1.5–2.5)
MCH RBC QN AUTO: 29.7 PG (ref 27–33)
MCHC RBC AUTO-ENTMCNC: 32.7 G/DL (ref 33.7–35.3)
MCV RBC AUTO: 90.9 FL (ref 81.4–97.8)
METHADONE UR QL SCN: NEGATIVE
MONOCYTES # BLD AUTO: 0.78 K/UL (ref 0–0.85)
MONOCYTES NFR BLD AUTO: 7.1 % (ref 0–13.4)
MORPHOLOGY BLD-IMP: NORMAL
NEUTROPHILS # BLD AUTO: 4 K/UL (ref 1.82–7.42)
NEUTROPHILS NFR BLD: 36.2 % (ref 44–72)
NRBC # BLD AUTO: 0 K/UL
NRBC BLD-RTO: 0 /100 WBC
OPIATES UR QL SCN: NEGATIVE
OXYCODONE UR QL SCN: NEGATIVE
PCP UR QL SCN: NEGATIVE
PLATELET # BLD AUTO: 372 K/UL (ref 164–446)
PMV BLD AUTO: 9.5 FL (ref 9–12.9)
POTASSIUM SERPL-SCNC: 4.6 MMOL/L (ref 3.6–5.5)
PROPOXYPH UR QL SCN: NEGATIVE
RBC # BLD AUTO: 5.18 M/UL (ref 4.7–6.1)
SODIUM SERPL-SCNC: 139 MMOL/L (ref 135–145)
WBC # BLD AUTO: 11.1 K/UL (ref 4.8–10.8)

## 2018-02-27 PROCEDURE — 83735 ASSAY OF MAGNESIUM: CPT

## 2018-02-27 PROCEDURE — 85025 COMPLETE CBC W/AUTO DIFF WBC: CPT

## 2018-02-27 PROCEDURE — 93010 ELECTROCARDIOGRAM REPORT: CPT | Performed by: INTERNAL MEDICINE

## 2018-02-27 PROCEDURE — 700102 HCHG RX REV CODE 250 W/ 637 OVERRIDE(OP): Performed by: HOSPITALIST

## 2018-02-27 PROCEDURE — A9270 NON-COVERED ITEM OR SERVICE: HCPCS | Performed by: HOSPITALIST

## 2018-02-27 PROCEDURE — 83880 ASSAY OF NATRIURETIC PEPTIDE: CPT

## 2018-02-27 PROCEDURE — 700111 HCHG RX REV CODE 636 W/ 250 OVERRIDE (IP): Performed by: HOSPITALIST

## 2018-02-27 PROCEDURE — 36415 COLL VENOUS BLD VENIPUNCTURE: CPT

## 2018-02-27 PROCEDURE — 80048 BASIC METABOLIC PNL TOTAL CA: CPT

## 2018-02-27 PROCEDURE — 80307 DRUG TEST PRSMV CHEM ANLYZR: CPT

## 2018-02-27 PROCEDURE — G0378 HOSPITAL OBSERVATION PER HR: HCPCS

## 2018-02-27 PROCEDURE — 93306 TTE W/DOPPLER COMPLETE: CPT

## 2018-02-27 PROCEDURE — 83036 HEMOGLOBIN GLYCOSYLATED A1C: CPT

## 2018-02-27 PROCEDURE — 93005 ELECTROCARDIOGRAM TRACING: CPT | Performed by: HOSPITALIST

## 2018-02-27 PROCEDURE — 99217 PR OBSERVATION CARE DISCHARGE: CPT | Performed by: INTERNAL MEDICINE

## 2018-02-27 PROCEDURE — 93306 TTE W/DOPPLER COMPLETE: CPT | Mod: 26 | Performed by: INTERNAL MEDICINE

## 2018-02-27 RX ADMIN — OMEGA-3 FATTY ACIDS CAP 1000 MG 1000 MG: 1000 CAP at 08:35

## 2018-02-27 RX ADMIN — ENOXAPARIN SODIUM 40 MG: 100 INJECTION SUBCUTANEOUS at 08:36

## 2018-02-27 RX ADMIN — ASPIRIN 325 MG: 325 TABLET ORAL at 08:35

## 2018-02-27 RX ADMIN — METOPROLOL TARTRATE 25 MG: 25 TABLET, FILM COATED ORAL at 08:35

## 2018-02-27 ASSESSMENT — PAIN SCALES - GENERAL
PAINLEVEL_OUTOF10: 0

## 2018-02-27 ASSESSMENT — LIFESTYLE VARIABLES: EVER_SMOKED: YES

## 2018-02-27 NOTE — CARE PLAN
Problem: Venous Thromboembolism (VTW)/Deep Vein Thrombosis (DVT) Prevention:  Goal: Patient will participate in Venous Thrombosis (VTE)/Deep Vein Thrombosis (DVT)Prevention Measures  Outcome: PROGRESSING AS EXPECTED  Patient assessed every shift for pain in calves. DVT prophylaxis in place, lovenox. Patient educated to let RN know if patient develops pain, new redness, or heat in calves. Pt. Verbalized understanding.     Problem: Bowel/Gastric:  Goal: Normal bowel function is maintained or improved  Outcome: PROGRESSING AS EXPECTED  Pt with normal bowel function, BM this morning.

## 2018-02-27 NOTE — ED PROVIDER NOTES
ED Provider Note    ERP addendum on February 26, 2018 at 5:28 PM.    I was asked by the admitting staff to complete a C4 work injury form for this patient. I reviewed Dr. Patel's dictation and briefly spoke with the patient. The patient says that he was at work but this was not caused by his work. I have filled out the C4 form accordingly. I had no other involvement in this patient's care.

## 2018-02-27 NOTE — CONSULTS
Cardiology Consult Note:                                       Note Author:   Rosa Hayden  Date & Time note created:    2/27/2018   11:20 AM     Referring MD:  Dr. Anselmo Guajardo M.D.    Patient ID:  Name:             Nicholas Boykin     YOB: 1975  Age:                 43 y.o.  male   MRN:               [id number]                                                             Reason for Consult:      Rapid Heart Rate/Supraventricular Tachycardia     History of Present Illness:    Nicholas Boykin is a 43 y.o. Male with a medical history of ? PAF/FlutteR, SVT and DLD was admitted with symptomatic supraventricular tachycardia. Stated that his recent symptoms comprised of palpitations, Worsening shortness of breath, chest heaviness and dizziness started 3 days prior to his recent admission. He denies any exacerbating factors, recent drug use, heavy alcohol use, recent stressors or changes in medications. He was first diagnosed with atrial arrhythmias 8 years ago, however he is unaware of the exact type of arrhythmia. Currently he is on regular follow up with cardiology at [facility] and based on him he was prescribed Digoxin for breakthrough tachyarrhythmia. For his current episode, he tried multiple dosages of digoxin w/o any response. He mentions he had similar episode couple months ago required electrical cardioversion. Otherwise he has couple seconds of palpitations once or twice daily lasting couple seconds.     He is non-smoker, no drug use, no prior history of structural heart disease or CAD and no family history of arrhythmias. He does admits to snoring and he was asked by his dentist before to have a sleep study.  On presentation at the ED, He was found to be in SVT with HR 240S, Failed to convert with 6 and 12mg adenosine and finally responded to 5mg of Metoprolol IV. He is currently asymptomatic with HR 60-70s on Metoprolol 25mg BID.          Review of Systems:      Constitutional: Denies  fevers, Denies weight changes  Eyes: Denies changes in vision, no eye pain  Ears/Nose/Throat/Mouth: Denies nasal congestion or sore throat   Cardiovascular: Denies chest pain, Denies palpitations   Respiratory: Denie shortness of breath , Denies cough  Gastrointestinal/Hepatic: Denies abdominal pain, nausea, vomiting, diarrhea, constipation or GI bleeding   Genitourinary: Denies dysuria or frequency  Musculoskeletal/Rheum: Denies  joint pain and swelling, No edema  Skin: Denies rash  Neurological: Denies headache, confusion, memory loss or focal weakness/parasthesias  Psychiatric: denies mood disorder   Endocrine: Nancie thyroid problems  Heme/Oncology/Lymph Nodes: Denies enlarged lymph nodes, denies brusing or known bleeding disorder  All other systems were reviewed and are negative (AMA/CMS criteria)                Past Medical History:   Past Medical History:   Diagnosis Date   • Hyperlipidemia    • Paroxysmal atrial fibrillation (CMS-HCC)      Active Hospital Problems    Diagnosis   • SVT (supraventricular tachycardia) (CMS-[facility]) [I47.1]     Priority: High   • Elevated brain natriuretic peptide (BNP) level [R79.89]     Priority: Medium   • Hyperglycemia [R73.9]     Priority: Low       Past Surgical History:  History reviewed. No pertinent surgical history.    Hospital Medications:  Current Facility-Administered Medications   Medication Dose   • aspirin (ASA) tablet 325 mg  325 mg   • fish oil capsule 1,000 mg  1,000 mg   • senna-docusate (PERICOLACE or SENOKOT S) 8.6-50 MG per tablet 2 Tab  2 Tab    And   • polyethylene glycol/lytes (MIRALAX) PACKET 1 Packet  1 Packet    And   • magnesium hydroxide (MILK OF MAGNESIA) suspension 30 mL  30 mL    And   • bisacodyl (DULCOLAX) suppository 10 mg  10 mg   • ondansetron (ZOFRAN) syringe/vial injection 4 mg  4 mg   • ondansetron (ZOFRAN ODT) dispertab 4 mg  4 mg   • promethazine (PHENERGAN) tablet 12.5-25 mg  12.5-25 mg   • promethazine (PHENERGAN) suppository 12.5-25 mg   "12.5-25 mg   • prochlorperazine (COMPAZINE) injection 5-10 mg  5-10 mg   • acetaminophen (TYLENOL) tablet 650 mg  650 mg   • enoxaparin (LOVENOX) inj 40 mg  40 mg   • metoprolol (LOPRESSOR) tablet 25 mg  25 mg   • nicotine polacrilex (NICORETTE) 2 MG piece 2 mg  2 mg         Current Outpatient Medications:  Prescriptions Prior to Admission   Medication Sig Dispense Refill Last Dose   • digoxin (LANOXIN) 125 MCG Tab Take 250 mcg by mouth every day.   2/24/2018 at pm   • docosahexanoic acid (OMEGA 3 FA) 1000 MG CAPS Take 1,000 mg by mouth every day.   2/24/2018 at am       Medication Allergy:  No Known Allergies    Family History:  Family History   Problem Relation Age of Onset   • Heart Disease Maternal Grandmother        Social History:  Social History     Social History   • Marital status:      Spouse name: N/A   • Number of children: N/A   • Years of education: N/A     Occupational History   • Not on file.     Social History Main Topics   • Smoking status: Former Smoker     Years: 6.00     Quit date: 1/1/2006   • Smokeless tobacco: Not on file   • Alcohol use 2.5 oz/week     5 Cans of beer per week   • Drug use: Unknown   • Sexual activity: Not on file     Other Topics Concern   • Not on file     Social History Narrative   • No narrative on file         Physical Exam:  Vitals/ General Appearance:   Weight/BMI: Body mass index is 30.07 kg/m².  Blood pressure 113/79, pulse 76, temperature 36.6 °C (97.9 °F), resp. rate 16, height 1.727 m (5' 8\"), weight 89.7 kg (197 lb 12 oz), SpO2 94 %.  Vitals:    02/26/18 2043 02/27/18 0059 02/27/18 0302 02/27/18 0830   BP: 119/86 100/66 102/71 113/79   Pulse: 60 71 67 76   Resp: 18 16 16 16   Temp: 36.3 °C (97.3 °F) 36.1 °C (97 °F) 36.3 °C (97.4 °F) 36.6 °C (97.9 °F)   SpO2: 95% 94% 94%    Weight:       Height:         Oxygen Therapy:  Pulse Oximetry: 94 %, O2 (LPM): 0, O2 Delivery: None (Room Air)    Constitutional:  Well developed, Well nourished, No acute " distress  HENMT:  Normocephalic, Atraumatic, Oropharynx moist mucous membranes, No oral exudates, Nose normal.  No thyromegaly.  Eyes:  EOMI, Conjunctiva normal, No discharge.  Neck:  Normal range of motion, No cervical tenderness,  no JVD.  Cardiovascular:  Normal heart rate, Normal rhythm, No murmurs, No rubs, No gallops.   Extremitites with intact distal pulses, no cyanosis, or edema.  Lungs:  Normal breath sounds, breath sounds clear to auscultation bilaterally,  no crackles, no wheezing.   Abdomen: Bowel sounds normal, Soft, No tenderness, No guarding, No rebound, No masses, No hepatosplenomegaly.  Skin: Warm, Dry, No erythema, No rash, no induration.  Neurologic: Alert & oriented x 3, No focal deficits noted, cranial nerves II through X are grossly intact.  Psychiatric: Affect normal, Judgment normal, Mood normal.      Lab Data Review:  Recent Results (from the past 24 hour(s))   TSH    Collection Time: 02/26/18 11:23 AM   Result Value Ref Range    TSH 2.800 0.380 - 5.330 uIU/mL   FREE THYROXINE    Collection Time: 02/26/18 11:23 AM   Result Value Ref Range    Free T-4 1.11 0.53 - 1.43 ng/dL   Basic Metabolic Panel (BMP)    Collection Time: 02/27/18  2:53 AM   Result Value Ref Range    Sodium 139 135 - 145 mmol/L    Potassium 4.6 3.6 - 5.5 mmol/L    Chloride 105 96 - 112 mmol/L    Co2 23 20 - 33 mmol/L    Glucose 78 65 - 99 mg/dL    Bun 12 8 - 22 mg/dL    Creatinine 0.83 0.50 - 1.40 mg/dL    Calcium 9.1 8.5 - 10.5 mg/dL    Anion Gap 11.0 0.0 - 11.9   CBC with Differential    Collection Time: 02/27/18  2:53 AM   Result Value Ref Range    WBC 11.1 (H) 4.8 - 10.8 K/uL    RBC 5.18 4.70 - 6.10 M/uL    Hemoglobin 15.4 14.0 - 18.0 g/dL    Hematocrit 47.1 42.0 - 52.0 %    MCV 90.9 81.4 - 97.8 fL    MCH 29.7 27.0 - 33.0 pg    MCHC 32.7 (L) 33.7 - 35.3 g/dL    RDW 42.0 35.9 - 50.0 fL    Platelet Count 372 164 - 446 K/uL    MPV 9.5 9.0 - 12.9 fL    Nucleated RBC 0.00 /100 WBC    NRBC (Absolute) 0.00 K/uL     Neutrophils-Polys 36.20 (L) 44.00 - 72.00 %    Lymphocytes 51.50 (H) 22.00 - 41.00 %    Monocytes 7.10 0.00 - 13.40 %    Eosinophils 4.40 0.00 - 6.90 %    Basophils 0.50 0.00 - 1.80 %    Immature Granulocytes 0.30 0.00 - 0.90 %    Lymphs (Absolute) 5.69 (H) 1.00 - 4.80 K/uL    Monos (Absolute) 0.78 0.00 - 0.85 K/uL    Eos (Absolute) 0.49 0.00 - 0.51 K/uL    Baso (Absolute) 0.06 0.00 - 0.12 K/uL    Immature Granulocytes (abs) 0.03 0.00 - 0.11 K/uL    Neutrophils (Absolute) 4.00 1.82 - 7.42 K/uL   BTYPE NATRIURETIC PEPTIDE    Collection Time: 18  2:53 AM   Result Value Ref Range    B Natriuretic Peptide 112 (H) 0 - 100 pg/mL   ESTIMATED GFR    Collection Time: 18  2:53 AM   Result Value Ref Range    GFR If African American >60 >60 mL/min/1.73 m 2    GFR If Non African American >60 >60 mL/min/1.73 m 2   PERIPHERAL SMEAR REVIEW    Collection Time: 18  2:53 AM   Result Value Ref Range    Peripheral Smear Review see below    DIFFERENTIAL COMMENT    Collection Time: 18  2:53 AM   Result Value Ref Range    Comments-Diff see below    MAGNESIUM    Collection Time: 18  2:53 AM   Result Value Ref Range    Magnesium 2.0 1.5 - 2.5 mg/dL   EKG    Collection Time: 18  8:48 AM   Result Value Ref Range    Report       [facility]    Test Date:  2018  Pt Name:    DEBBY PAGAN                 Department: 183  MRN:        [id number]                      Room:       T816  Gender:     Male                         Technician: [facility]  :        1975                   Requested By:PATIENCE GILLIAM  Order #:    [id number]                    Joan MD:    Measurements  Intervals                                Axis  Rate:       61                           P:          45  IN:         172                          QRS:        -22  QRSD:       106                          T:          12  QT:         388  QTc:        391    Interpretive Statements  SINUS RHYTHM  BORDERLINE INTRAVENTRICULAR CONDUCTION  DELAY  NONSPECIFIC T ABNORMALITIES, ANTERIOR LEADS  Compared to ECG 02/26/2018 09:48:32  T-wave abnormality now present  Sinus tachycardia no longer present  ST (T wave) deviation no longer present  Prolonged QT interval no longer present         Imaging/Procedures Review:    DX-CHEST-LIMITED (1 VIEW)   Final Result      No evidence of acute cardiopulmonary process.        Reviewed    EKG:   On admission:   SUPRAVENTRICULAR TACHYCARDIA, Rate: 244   RSR' IN V1 OR V2, PROBABLY NORMAL VARIANT   REPOLARIZATION ABNORMALITY, PROB RATE RELATED     Most Recent EKG:   SINUS RHYTHM, Rate : 61   BORDERLINE INTRAVENTRICULAR CONDUCTION DELAY   NONSPECIFIC T ABNORMALITIES, ANTERIOR LEADS    CXR:  No evidence of acute cardiopulmonary process      MDM (Assessment and Plan):     Active Hospital Problems    Diagnosis   • SVT (supraventricular tachycardia) (CMS-HCC) [I47.1]     Priority: High   • Elevated brain natriuretic peptide (BNP) level [R79.89]     Priority: Medium   • Hyperglycemia [R73.9]     Priority: Low         1. Supraventricular Tachycardia   -  Converted to NSR with Metoprolol   -  Concerns for concomitant PAF and/or Atrial Flutter is high with a [id number] 0.    -  Currently asymptomatic, with NSR in 60-70s.   -  Normal TSH, Negative UDS, Troponin and BNP wnl.   -  Discontinue Digoxin. Continue current Metoprolol.    -  Echocardiography and Stress test as an outpatient once Beta blockade optimized   -  There is a high concern for LADARIUS given his body habitus, Consider outpatient sleep study.    -  Patient educated regarding weight loss and Vagal Maneuvers.       2. Dyslipidemia    - Last Lipid panel 2014 sig for very high LDL and TG.     -  Consider repeating Lipid panel and treatment if necessary       3.Possible LADARIUS   - Snoring and increased BMI   -  Consider outpatient sleep study.     Thank for you allowing us to take part in your patient's care, please call should you have any questions or would like to discuss this  patient.

## 2018-02-27 NOTE — CARE PLAN
Problem: Safety  Goal: Will remain free from injury  Outcome: PROGRESSING AS EXPECTED  Pt educated regarding fall risk and safety, pt verbalizes understanding and calls appropriately for assistance. Bed is locked and in lowest position, safety maintained throughout shift.     Problem: Knowledge Deficit  Goal: Knowledge of disease process/condition, treatment plan, diagnostic tests, and medications will improve  Outcome: PROGRESSING AS EXPECTED  Pt educated regarding plan of care, pt is knowledgeable and involved in plan, verbalizes understanding.

## 2018-02-27 NOTE — DISCHARGE INSTRUCTIONS
Discharge Instructions    Discharged to home by car with relative. Discharged via walking, hospital escort: Refused.  Special equipment needed: Not Applicable    Be sure to schedule a follow-up appointment with your primary care doctor or any specialists as instructed.     Discharge Plan:   Diet Plan: Discussed  Activity Level: Discussed  Confirmed Follow up Appointment: Appointment Scheduled  Confirmed Symptoms Management: Discussed  Medication Reconciliation Updated: Yes  Influenza Vaccine Indication: Patient Refuses    I understand that a diet low in cholesterol, fat, and sodium is recommended for good health. Unless I have been given specific instructions below for another diet, I accept this instruction as my diet prescription.     Special Instructions: None    · Is patient discharged on Warfarin / Coumadin?   No       Paroxysmal Supraventricular Tachycardia  Paroxysmal supraventricular tachycardia (PSVT) is when your heart beats very quickly and then suddenly stops beating so quickly. You may or may not have any symptoms when this occurs. It is usually not dangerous. It can lead to problems if it happens often or it lasts a long time.  HOME CARE   · Take medicines only as told by your doctor.  · Avoid caffeine or limit how much of it you consume as told by your doctor. Caffeine is found in coffee, tea, soda, and chocolate.  · Avoid alcohol or limit how much of it you drink as told by your doctor.  · Do not smoke.  · Try to get at least 7 hours of sleep each night.  · Find healthy ways to reduce stress.  · Do self-treatments as told by your doctor to slow down your heart (vagus nerve stimulation). Your doctor may tell you to:  ¨ Hold your breath and push, as though you are going to the bathroom.  ¨ Rub an area on one side of your neck.  ¨ Bend forward with your head between your legs.  ¨ Bend forward with your head between your legs, then cough.  ¨ Rub your eyeballs with your eyes closed.  · Maintain a healthy  weight.  · Get some exercise on most days. Ask your doctor about some good activities for you.  GET HELP IF:  · You are having episodes of a fast heartbeat more often.  · Your episodes are lasting longer.  · Your self-treatments to slow down your heart are no longer helping.  · You have new symptoms during an episode.  GET HELP RIGHT AWAY IF:  · You have chest pain.  · You have trouble breathing.  · You have an episode of a fast heartbeat that lasts longer than 20 minutes.  · You pass out (faint).  These symptoms may be an emergency. Do not wait to see if the symptoms will go away. Get medical help right away. Call your local emergency services (911 in the [place] Do not drive yourself to the hospital.     This information is not intended to replace advice given to you by your health care provider. Make sure you discuss any questions you have with your health care provider.     Document Released: 12/18/2006 Document Revised: 01/08/2016 Document Reviewed: 05/28/2015  [employer] Interactive Patient Education ©2016 [employer].      Metoprolol tablets  What is this medicine?  METOPROLOL (me TOE proe lole) is a beta-blocker. Beta-blockers reduce the workload on the heart and help it to beat more regularly. This medicine is used to treat high blood pressure and to prevent chest pain. It is also used to after a heart attack and to prevent an additional heart attack from occurring.  This medicine may be used for other purposes; ask your health care provider or pharmacist if you have questions.  COMMON BRAND NAME(S): Lopressor  What should I tell my health care provider before I take this medicine?  They need to know if you have any of these conditions:  -diabetes  -heart or vessel disease like slow heart rate, worsening heart failure, heart block, sick sinus syndrome or Raynaud's disease  -kidney disease  -liver disease  -lung or breathing disease, like asthma or emphysema  -pheochromocytoma  -thyroid disease  -an unusual or  allergic reaction to metoprolol, other beta-blockers, medicines, foods, dyes, or preservatives  -pregnant or trying to get pregnant  -breast-feeding  How should I use this medicine?  Take this medicine by mouth with a drink of water. Follow the directions on the prescription label. Take this medicine immediately after meals. Take your doses at regular intervals. Do not take more medicine than directed. Do not stop taking this medicine suddenly. This could lead to serious heart-related effects.  Talk to your pediatrician regarding the use of this medicine in children. Special care may be needed.  Overdosage: If you think you have taken too much of this medicine contact a poison control center or emergency room at once.  NOTE: This medicine is only for you. Do not share this medicine with others.  What if I miss a dose?  If you miss a dose, take it as soon as you can. If it is almost time for your next dose, take only that dose. Do not take double or extra doses.  What may interact with this medicine?  Do not take this medicine with any of the following medications:  -sotalol  This medicine may also interact with the following medications:  -clonidine  -digoxin  -dobutamine  -epinephrine  -isoproterenol  -medicine to control heart rhythm like quinidine, propafenone  -medicine for depression like monoamine oxidase (MAO) inhibitors, fluoxetine, and paroxetine  -medicine for high blood pressure like calcium channel blockers  -reserpine  This list may not describe all possible interactions. Give your health care provider a list of all the medicines, herbs, non-prescription drugs, or dietary supplements you use. Also tell them if you smoke, drink alcohol, or use illegal drugs. Some items may interact with your medicine.  What should I watch for while using this medicine?  Visit your doctor or health care professional for regular check ups. Contact your doctor right away if your symptoms worsen. Check your blood pressure  and pulse rate regularly. Ask your health care professional what your blood pressure and pulse rate should be, and when you should contact them.  You may get drowsy or dizzy. Do not drive, use machinery, or do anything that needs mental alertness until you know how this medicine affects you. Do not sit or stand up quickly, especially if you are an older patient. This reduces the risk of dizzy or fainting spells. Contact your doctor if these symptoms continue. Alcohol may interfere with the effect of this medicine. Avoid alcoholic drinks.  What side effects may I notice from receiving this medicine?  Side effects that you should report to your doctor or health care professional as soon as possible:  -allergic reactions like skin rash, itching or hives  -cold or numb hands or feet  -depression  -difficulty breathing  -faint  -fever with sore throat  -irregular heartbeat, chest pain  -rapid weight gain  -swollen legs or ankles  Side effects that usually do not require medical attention (report to your doctor or health care professional if they continue or are bothersome):  -anxiety or nervousness  -change in sex drive or performance  -dry skin  -headache  -nightmares or trouble sleeping  -short term memory loss  -stomach upset or diarrhea  -unusually tired  This list may not describe all possible side effects. Call your doctor for medical advice about side effects. You may report side effects to FDA at 9-810-FDA-9749.  Where should I keep my medicine?  Keep out of the reach of children.  Store at room temperature between 15 and 30 degrees C (59 and 86 degrees F). Throw away any unused medicine after the expiration date.  NOTE: This sheet is a summary. It may not cover all possible information. If you have questions about this medicine, talk to your doctor, pharmacist, or health care provider.  © 2014, [employer]. (2/27/2009 4:11:19 PM)      Depression / Suicide Risk    As you are discharged from this [facility]  Health facility, it is important to learn how to keep safe from harming yourself.    Recognize the warning signs:  · Abrupt changes in personality, positive or negative- including increase in energy   · Giving away possessions  · Change in eating patterns- significant weight changes-  positive or negative  · Change in sleeping patterns- unable to sleep or sleeping all the time   · Unwillingness or inability to communicate  · Depression  · Unusual sadness, discouragement and loneliness  · Talk of wanting to die  · Neglect of personal appearance   · Rebelliousness- reckless behavior  · Withdrawal from people/activities they love  · Confusion- inability to concentrate     If you or a loved one observes any of these behaviors or has concerns about self-harm, here's what you can do:  · Talk about it- your feelings and reasons for harming yourself  · Remove any means that you might use to hurt yourself (examples: pills, rope, extension cords, firearm)  · Get professional help from the community (Mental Health, Substance Abuse, psychological counseling)  · Do not be alone:Call your Safe Contact- someone whom you trust who will be there for you.  · Call your local CRISIS HOTLINE 229-1905 or 500-960-9904  · Call your local Children's Mobile Crisis Response Team [place] (382) 199-2453 or www.[employer]  · Call the toll free National Suicide Prevention Hotlines   · National Suicide Prevention Lifeline 959-430-UVYX (2718)  · National Hope Line Network 800-SUICIDE (581-3393)

## 2018-02-27 NOTE — PROGRESS NOTES
Assumed care at 0715. Bedside report received from Night RN [place]. Patient's chart and MAR reviewed. 12 hour chart check complete. Assessment complete, pt has no complaints of pain at this time. Pt is awake in bed. Pt is A & O x 4. Patient was updated on plan of care for the day. Questions answered and concerns addressed.  Pt denies any additional needs at this time. White board updated. Call light, phone and personal belongings within reach. Bed alarm on and working appropriately. Vital signs stable.

## 2018-02-27 NOTE — PROGRESS NOTES
Patient arrived to floor. Assumed care at 1750. Assessment complete, A&Ox 4.Patient on monitor, Monitor room notified, SR-ST . Pt oriented to room, educated on call light/extension/phone system, RN and CNA extension numbers provided to pt, white board updated. Fall assessment complete up self, patient educated to call for assistance, pt verbalizes understanding. Pt denies pain or any additional needs at this time. Questions and concerns addressed. Call light, phone, and personal belongings within reach.

## 2018-02-27 NOTE — PROGRESS NOTES
IV and tele box removed. Patient discharged home. Discharge instructions and medications discussed with patient, all questions answered, pt verbalized understanding. Follow up appointments scheduled. Discharge instructions, prescriptions and personal belongings with patient. Prescriptions e-scribed to pharmacy. Work note provided. Patient walked down to car with wife, steady on his feet. Patient off unit without incident.

## 2018-02-27 NOTE — DISCHARGE SUMMARY
HOSPITAL MEDICINE DISCHARGE SUMMARY    Name: Nicholas Boykin  MRN: [id number]  : 1975  Admit Date: 2018  Discharge Date: 2018  Attending Provider: Chad Martinez M.D.  Admitting Provider: Anselmo Guajardo M.D.  Primary Care Physician: Pcp Unknown    DISCHARGE DIAGNOSES:   Principal Problem:    Paroxysmal SVT (supraventricular tachycardia) ([facility]) POA: Yes  Active Problems:    Hyperlipidemia POA: Yes    Possible LADARIUS (obstructive sleep apnea) POA: Yes  Resolved Problems:    Hyperglycemia, stress related POA: Yes      SUMMARY OF EVENTS LEADING TO ADMISSION:   43 y.o. male hyperlipidemia, and history of paroxysmal SVTs, admitted 2018 with palpitations.     For further details of history of present illness, past medical/social/family histories, allergies and medication, please refer to copy of admission note by Dr. Anselmo Guajardo M.D.    HOSPITAL COURSE:   The patient was admitted to the hospitalist service after being initially evaluated in the ED where he was noted to have SVT with rate in the 240s. He received adenosine, and IV metoprolol which slowed his heart rate. Initial blood workup showed WBC of 12.7. Troponin was negative. . Chest x-ray showed no focal infiltrate or pulmonary edema, with normal heart size. He was started on oral metoprolol, with no further recurrence of his SVT. History of digoxin was stopped. Cardiology was consulted, who agreed on the management. Echocardiogram was obtained which showed normal systolic function, without any valvular disease. He did have initial hyperglycemia, which resolved and this was felt to be stress related.    He was monitored on telemetry, and his heart rate was maintained in the 60 to 70s, and maintain normal sinus rhythm. His WBC count normalized. TSH was normal.     With  his clinical improvement, he was deemed ready to be discharged from the hospital as he did not have any further inpatient needs. Patient felt  comfortable going home. The discharge plan was discussed with the patient, and he was agreeable to it.     The patient was subsequently sent home in improved and stable condition.     DISCHARGE DISPOSITION: recovered as expected.     FOLLOW-UP ISSUES:   1. Paroxysmal SVT - he will be continued on oral metoprolol, instead of his digoxin. He will follow-up with his cardiologist as outpatient.     CHANGES IN MANAGEMENT OF CHRONIC CONDITION: As above.     PENDING TESTS: none    FOLLOW-UP TESTS ORDERED POST DISCHARGE: none    DISCHARGE MEDICATIONS:   Prior to Admission medications    Medication Sig Start Date End Date Taking? Authorizing Provider   metoprolol (LOPRESSOR) 25 MG Tab Take 1 Tab by mouth 2 Times a Day. 2/27/18  Yes Chad Martinez M.D.   docosahexanoic acid (OMEGA 3 FA) 1000 MG CAPS Take 1,000 mg by mouth every day.    Physician Outpatient          FOLLOW-UP APPOINTMENTS:   Compa Gtz M.D.  [address] [address]  [phone]    Go on 3/14/2018  Please arrive at 2:05 PM  for your appointment. If you are unable to make appointment please call to cancel or reschedule. Thank you         For further details on discharge medications, patient education, diet, and activity, please refer to electronic copy of discharge instructions.       TIME SPENT: 43 minutes, with greater than 50% of the time spent on face-to-face encounter, addressing medical issues, coordination of care, counseling, discharge planning, medication reconciliation, and documentation.

## 2018-04-17 ENCOUNTER — PATIENT OUTREACH (OUTPATIENT)
Dept: HEALTH INFORMATION MANAGEMENT | Facility: OTHER | Age: 43
End: 2018-04-17

## 2018-04-17 ENCOUNTER — HOSPITAL ENCOUNTER (EMERGENCY)
Facility: MEDICAL CENTER | Age: 43
End: 2018-04-17
Attending: EMERGENCY MEDICINE
Payer: COMMERCIAL

## 2018-04-17 ENCOUNTER — APPOINTMENT (OUTPATIENT)
Dept: RADIOLOGY | Facility: MEDICAL CENTER | Age: 43
End: 2018-04-17
Attending: EMERGENCY MEDICINE
Payer: COMMERCIAL

## 2018-04-17 VITALS
DIASTOLIC BLOOD PRESSURE: 55 MMHG | RESPIRATION RATE: 15 BRPM | HEIGHT: 68 IN | WEIGHT: 201.72 LBS | TEMPERATURE: 96.3 F | BODY MASS INDEX: 30.57 KG/M2 | SYSTOLIC BLOOD PRESSURE: 88 MMHG | OXYGEN SATURATION: 99 % | HEART RATE: 57 BPM

## 2018-04-17 DIAGNOSIS — I47.10 SVT (SUPRAVENTRICULAR TACHYCARDIA): ICD-10-CM

## 2018-04-17 DIAGNOSIS — I48.0 PAROXYSMAL ATRIAL FIBRILLATION (HCC): ICD-10-CM

## 2018-04-17 LAB
ANION GAP SERPL CALC-SCNC: 9 MMOL/L (ref 0–11.9)
BASOPHILS # BLD AUTO: 0 % (ref 0–1.8)
BASOPHILS # BLD: 0 K/UL (ref 0–0.12)
BUN SERPL-MCNC: 17 MG/DL (ref 8–22)
CALCIUM SERPL-MCNC: 9.6 MG/DL (ref 8.5–10.5)
CHLORIDE SERPL-SCNC: 105 MMOL/L (ref 96–112)
CO2 SERPL-SCNC: 23 MMOL/L (ref 20–33)
CREAT SERPL-MCNC: 1.08 MG/DL (ref 0.5–1.4)
EKG IMPRESSION: NORMAL
EOSINOPHIL # BLD AUTO: 0.11 K/UL (ref 0–0.51)
EOSINOPHIL NFR BLD: 0.9 % (ref 0–6.9)
ERYTHROCYTE [DISTWIDTH] IN BLOOD BY AUTOMATED COUNT: 42.4 FL (ref 35.9–50)
GLUCOSE SERPL-MCNC: 155 MG/DL (ref 65–99)
HCT VFR BLD AUTO: 50.8 % (ref 42–52)
HGB BLD-MCNC: 16.8 G/DL (ref 14–18)
LYMPHOCYTES # BLD AUTO: 6.15 K/UL (ref 1–4.8)
LYMPHOCYTES NFR BLD: 49.6 % (ref 22–41)
MANUAL DIFF BLD: NORMAL
MCH RBC QN AUTO: 30.2 PG (ref 27–33)
MCHC RBC AUTO-ENTMCNC: 33.1 G/DL (ref 33.7–35.3)
MCV RBC AUTO: 91.4 FL (ref 81.4–97.8)
MONOCYTES # BLD AUTO: 0.53 K/UL (ref 0–0.85)
MONOCYTES NFR BLD AUTO: 4.3 % (ref 0–13.4)
MORPHOLOGY BLD-IMP: NORMAL
NEUTROPHILS # BLD AUTO: 5.6 K/UL (ref 1.82–7.42)
NEUTROPHILS NFR BLD: 44.3 % (ref 44–72)
NEUTS BAND NFR BLD MANUAL: 0.9 % (ref 0–10)
NRBC # BLD AUTO: 0 K/UL
NRBC BLD-RTO: 0 /100 WBC
PLATELET # BLD AUTO: 388 K/UL (ref 164–446)
PLATELET BLD QL SMEAR: NORMAL
PMV BLD AUTO: 9.5 FL (ref 9–12.9)
POTASSIUM SERPL-SCNC: 5 MMOL/L (ref 3.6–5.5)
RBC # BLD AUTO: 5.56 M/UL (ref 4.7–6.1)
RBC BLD AUTO: PRESENT
SODIUM SERPL-SCNC: 137 MMOL/L (ref 135–145)
TROPONIN I SERPL-MCNC: <0.01 NG/ML (ref 0–0.04)
VARIANT LYMPHS BLD QL SMEAR: NORMAL
WBC # BLD AUTO: 12.4 K/UL (ref 4.8–10.8)

## 2018-04-17 PROCEDURE — 700105 HCHG RX REV CODE 258: Performed by: EMERGENCY MEDICINE

## 2018-04-17 PROCEDURE — 700101 HCHG RX REV CODE 250: Performed by: EMERGENCY MEDICINE

## 2018-04-17 PROCEDURE — 84484 ASSAY OF TROPONIN QUANT: CPT

## 2018-04-17 PROCEDURE — 85027 COMPLETE CBC AUTOMATED: CPT

## 2018-04-17 PROCEDURE — 80048 BASIC METABOLIC PNL TOTAL CA: CPT

## 2018-04-17 PROCEDURE — 93005 ELECTROCARDIOGRAM TRACING: CPT | Performed by: EMERGENCY MEDICINE

## 2018-04-17 PROCEDURE — 96361 HYDRATE IV INFUSION ADD-ON: CPT

## 2018-04-17 PROCEDURE — 71045 X-RAY EXAM CHEST 1 VIEW: CPT

## 2018-04-17 PROCEDURE — 96374 THER/PROPH/DIAG INJ IV PUSH: CPT

## 2018-04-17 PROCEDURE — 93005 ELECTROCARDIOGRAM TRACING: CPT

## 2018-04-17 PROCEDURE — 96376 TX/PRO/DX INJ SAME DRUG ADON: CPT

## 2018-04-17 PROCEDURE — 99285 EMERGENCY DEPT VISIT HI MDM: CPT

## 2018-04-17 PROCEDURE — 85007 BL SMEAR W/DIFF WBC COUNT: CPT

## 2018-04-17 RX ORDER — SODIUM CHLORIDE 9 MG/ML
1000 INJECTION, SOLUTION INTRAVENOUS ONCE
Status: COMPLETED | OUTPATIENT
Start: 2018-04-17 | End: 2018-04-17

## 2018-04-17 RX ORDER — FLECAINIDE ACETATE 50 MG/1
50 TABLET ORAL 2 TIMES DAILY
Qty: 60 TAB | Refills: 0 | Status: SHIPPED | OUTPATIENT
Start: 2018-04-17

## 2018-04-17 RX ORDER — METOPROLOL TARTRATE 1 MG/ML
5 INJECTION, SOLUTION INTRAVENOUS ONCE
Status: COMPLETED | OUTPATIENT
Start: 2018-04-17 | End: 2018-04-17

## 2018-04-17 RX ORDER — ASPIRIN 325 MG
325 TABLET ORAL ONCE
COMMUNITY

## 2018-04-17 RX ORDER — METOPROLOL SUCCINATE 25 MG/1
25 TABLET, EXTENDED RELEASE ORAL DAILY
Qty: 30 TAB | Refills: 0 | Status: SHIPPED | OUTPATIENT
Start: 2018-04-17

## 2018-04-17 RX ORDER — METOPROLOL SUCCINATE 25 MG/1
25 TABLET, EXTENDED RELEASE ORAL DAILY
Qty: 30 TAB | Refills: 0 | Status: SHIPPED | OUTPATIENT
Start: 2018-04-17 | End: 2018-04-17

## 2018-04-17 RX ADMIN — SODIUM CHLORIDE 1000 ML: 9 INJECTION, SOLUTION INTRAVENOUS at 07:45

## 2018-04-17 RX ADMIN — METOPROLOL TARTRATE 5 MG: 5 INJECTION, SOLUTION INTRAVENOUS at 07:10

## 2018-04-17 RX ADMIN — SODIUM CHLORIDE 1000 ML: 9 INJECTION, SOLUTION INTRAVENOUS at 06:45

## 2018-04-17 RX ADMIN — METOPROLOL TARTRATE 5 MG: 5 INJECTION, SOLUTION INTRAVENOUS at 06:21

## 2018-04-17 ASSESSMENT — ENCOUNTER SYMPTOMS
COUGH: 0
LOSS OF CONSCIOUSNESS: 0
NECK PAIN: 0
DIZZINESS: 1
CHILLS: 0
SENSORY CHANGE: 0
BACK PAIN: 0
FEVER: 0
SPEECH CHANGE: 0
SEIZURES: 0
VOMITING: 0
SHORTNESS OF BREATH: 1
NAUSEA: 0
PND: 0
HEADACHES: 0
PALPITATIONS: 1

## 2018-04-17 ASSESSMENT — PAIN SCALES - GENERAL: PAINLEVEL_OUTOF10: 0

## 2018-04-17 NOTE — ED TRIAGE NOTES
"Nicholas Boykin    Chief Complaint   Patient presents with   • Palpitations     starting sunday night    • Shortness of Breath   • Dizziness       Pt ambulatory to triage with above complaint. Pts symptoms started Sunday night around 8 pm.  +dizziness +SOB. Denies CP.  PMH: paroxsymal a fib, pt on metoprolol. EKG done in triage.     Pt ambulated with ED tech to [facility]    Blood Pressure: 104/81, Pulse: (!) 119, Respiration: 14, Temperature: (!) 35.6 °C (96 °F), Height: 172.7 cm (5' 8\"), Weight: 91.5 kg (201 lb 11.5 oz), BMI (Calculated): 30.67, BSA (Calculated): 2.1, Pulse Oximetry: 97 %      "

## 2018-04-17 NOTE — ED PROVIDER NOTES
ED Provider Note    Scribed for Dayna Crews M.D. by Sonia Mcpherson. 4/17/2018, 6:05 AM.    Primary care provider: Nav Smith M.D.; Dr. Gtz, Cardiology  Means of arrival: Walk in  History obtained from: Patient  History limited by: None    CHIEF COMPLAINT  •  Palpitations     HPI  Nicholas Boykin is a 43 y.o. male who presents to the Emergency Department for palpitations with an onset of 2 days ago. Patient developed an acute onset of intermittent palpitations Sunday evening. His symptoms have been constant for the past two hours this morning.  He has a history of SVT treated with Metoprolol. Last episode of palpitations was one month ago. He complains of chest pressure, dizziness and shortness of breath. He reports taking his medications this morning including Aspirin. Negative for fever, leg swelling or leg pain.        REVIEW OF SYSTEMS  Pertinent positives include palpitations, chest pressure, dizziness and shortness of breath. Pertinent negatives include no fever, leg swelling or leg pain.  All other systems reviewed and negative. See HPI for further details. C.      PAST MEDICAL HISTORY  Patient has a past medical history of SVT; Hyperlipidemia and Paroxysmal atrial fibrillation (CMS-HCC).      SURGICAL HISTORY  Patient denies a pertinent surgical history.      SOCIAL HISTORY  Social History   Substance Use Topics   • Smoking status: Former Smoker     Years: 6.00     Quit date: 1/1/2006   • Alcohol use 2.5 oz/week     5 Cans of beer per week      History   Drug Use No       FAMILY HISTORY  Family History   Problem Relation Age of Onset   • Heart Disease Maternal Grandmother        CURRENT MEDICATIONS  Home Medications     Reviewed by Ana Rodírguez (Pharmacy Tech) on 04/17/18 at 0943  Med List Status: Complete   Medication Last Dose Status   aspirin (ASA) 325 MG Tab 4/17/2018 Active   docosahexanoic acid (OMEGA 3 FA) 1000 MG CAPS 4/16/2018 Active   Ginseng (GIN-ZING PO) 4/16/2018 Active  "  metoprolol (LOPRESSOR) 25 MG Tab 4/17/2018 Active                ALLERGIES  None      PHYSICAL EXAM  VITAL SIGNS: /81   Pulse (!) 119   Temp (!) 35.6 °C (96 °F) (Temporal)   Resp 14   Ht 1.727 m (5' 8\")   Wt 91.5 kg (201 lb 11.5 oz)   SpO2 97%   BMI 30.67 kg/m²       Constitutional: Pale slightly diaphoretic moderately ill-appearing  HENT: No signs of trauma, Bilateral external ears normal, Nose normal.   Eyes: Pupils are equal and reactive, Conjunctiva normal, Non-icteric.   Neck: Normal range of motion, No tenderness, Supple, No stridor.   Cardiovascular: Extremely tachycardic no murmurs.   Thorax & Lungs: Normal breath sounds, No respiratory distress, No wheezing, No chest tenderness.   Abdomen: Bowel sounds normal, Soft, No tenderness, No masses, No peritoneal signs.  Skin: Warm, Dry, No erythema, No rash.   Musculoskeletal:  No major deformities noted.   Neurologic: Alert, moving all extremities without difficulty, no focal deficits.      LABS  Labs Reviewed   CBC WITH DIFFERENTIAL - Abnormal; Notable for the following:        Result Value    WBC 12.4 (*)     MCHC 33.1 (*)     Lymphocytes 49.60 (*)     Lymphs (Absolute) 6.15 (*)     All other components within normal limits   BASIC METABOLIC PANEL - Abnormal; Notable for the following:     Glucose 155 (*)     All other components within normal limits   TROPONIN   ESTIMATED GFR   DIFFERENTIAL MANUAL   PERIPHERAL SMEAR REVIEW   PLATELET ESTIMATE   MORPHOLOGY     All labs reviewed by me.      EKG  12 Lead EKG interpreted by me to show:  Atrial fibrillation  Rate 125  Axis: Normal  Intervals: Normal  Normal T waves  Normal ST segments  My impression of this EKG: Atrial fibrillation; Does not indicate ischemia.      Repeat EKG  Obtained at 07:45  12 Lead EKG interpreted by me to show:  Regular rhythm  Rate: 125  Axis: Normal  No easily identifiable P waves  Intervals: Normal  Inverted T waves in lead III  Normal ST segments  My impression of this EKG: " Questionable sinus or junctional tachycardia; Does not indicate ischemia.      RADIOLOGY  DX-CHEST-PORTABLE (1 VIEW)   Final Result      No acute cardiopulmonary findings.        The radiologist's interpretation of all radiological studies have been reviewed by me.    CRITICAL CARE NOTE:  Critical care time was provided for 30 minutes minutes exclusive of separately billable procedures and treating other patients. This involved direct bedside patient care, speaking with family members, review of past medical records, reviewing the results of the laboratory and diagnostic studies, consulting with other physicians, as well as evaluating the effectiveness of the therapy instituted as described.      COURSE & MEDICAL DECISION MAKING  Pertinent Labs & Imaging studies reviewed. (See chart for details)    Differential Diagnoses include but are not limited to:  SVT, A. fib with RVR    6:05 AM Patient seen and examined at bedside. Patient presents for palpitations.      Initial orders in the Emergency Department included EKG, XR chest and laboratory testing: differential manual, peripheral smear review, platelet estimate, morphology, CBC with differential BMP, estimated GFR and troponin.  Initial treatment in the Emergency Department included 5 mg of Metoprolol IV.  1 L of intravenous fluids will be administered for tachycardia.     6:14 AM Obtained and reviewed patient's electronic medical record which indicates a history of SVT and palpitations. He was last seen for this in 02/2018 and was in SVT in 240's. Negative troponin. Normal echo. He was on Digoxin which was stopped. He presented initially with heart rates in the 220s. It was very regular. He was given an additional 5 mg of Lopressor.    7:05 AM Re-evaluated at bedside. Heart rate is in 120's. He will be administered an additional 5 mg of Lopressor IV.    7:35 AM Heart rate is 118.     7:39 AM On repeat evaluation, heart rate is 124.     7:48 AM Second liter of  "fluids ordered for persistent tachycardia.    9:18 AM Patient was updated on the plan for Cardiology to consult in the ED.     9:35 AM Patient was evaluated by Dr. Lim, Cardiology. Recommended he be started on Flecainide 50 mg twice a day and Toprol 25 mg a day. Discontinue Metoprolol. He requires a sleep study and follow up with Dr. Gtz.    9:45 AM On repeat evaluation, patient was updated on his medication changes. he verbalizes his understanding. There was a positive response to IV fluids. His heart rate improved significantly.    Discharge plan was discussed with the patient and includes following up with Dr. Gtz as soon as possible.      The patient will return for new or persisting symptoms including chest pain, palpitations or any additional concerns.  The patient verbalizes understanding and will comply.  Patient is stable at the time of discharge.  Vital signs were reviewed: BP (!) 88/55   Pulse (!) 53   Temp (!) 35.7 °C (96.3 °F)   Resp 15   Ht 1.727 m (5' 8\")   Wt 91.5 kg (201 lb 11.5 oz)   SpO2 90%   BMI 30.67 kg/m²      This is a 43-year-old man with a history of A. fib who presented initially what appeared to be SVT which progressed into A. fib at a slower rate. He is not anticoagulated at this time. He did convert after IV fluids and small bowel Lopressor to sinus rhythm. He stayed in sinus. He was seen by cardiology who recommended changing his medication adding flecainide in changing his metoprolol to Toprol-XL. This was done and patient will follow-up as an outpatient.      DISPOSITION  Patient will be discharged home in stable condition.      FOLLOW UP  Compa Gtz M.D.  [address]  [facility] [address]  [facility] [address]  [phone]    Schedule an appointment as soon as possible for a visit on 4/24/2018  Please arrive at 4:05 pm for your appointment with Savanah Aguirre . Thank you    [facility], Emergency Dept  [address] [facility] " [address]  [phone]    Return for worsening symptoms, pain or other concerns        OUTPATIENT MEDICATIONS  Discharge Medication List as of 4/17/2018  9:51 AM      START taking these medications    Details   flecainide (TAMBOCOR) 50 MG tablet Take 1 Tab by mouth 2 times a day., Disp-60 Tab, R-0, Print Rx Paper      metoprolol SR (TOPROL XL) 25 MG TABLET SR 24 HR Take 1 Tab by mouth every day., Disp-30 Tab, R-0, Print Rx Paper             DIAGNOSIS  1. Paroxysmal atrial fibrillation (CMS-HCC)    2. SVT (supraventricular tachycardia) (CMS-HCC)           The note accurately reflects work and decisions made by me.  Dayna Crews  4/17/2018  1:39 PM     ISonia (Scribe), am scribing for, and in the presence of, Dayna Crews M.D.    Electronically signed by: Sonia Mcpherson (Scribe), 4/17/2018    IDayna M.D. personally performed the services described in this documentation, as scribed by Sonia Mcpherson in my presence, and it is both accurate and complete.      This dictation has been created using voice recognition software and/or scribes. The accuracy of the dictation is limited by the abilities of the software and the expertise of the scribes. I expect there may be some errors of grammar and possibly content. I made every attempt to manually correct the errors within my dictation. However, errors related to voice recognition software and/or scribes may still exist and should be interpreted within the appropriate context.

## 2018-04-17 NOTE — ED NOTES
"Pt walked back from triage with feelings of a fib. +hx of a fib. Last was a month ago. Pt takes Metoprolol and took a full strength ASA this morning. Friend with pt. Pt acting appropriate c/o chest \"heaviness.\" Denies Cp.   "

## 2018-04-17 NOTE — DISCHARGE INSTRUCTIONS
Atrial Fibrillation  Atrial fibrillation is a type of irregular or rapid heartbeat (arrhythmia). In atrial fibrillation, the heart quivers continuously in a chaotic pattern. This occurs when parts of the heart receive disorganized signals that make the heart unable to pump blood normally. This can increase the risk for stroke, heart failure, and other heart-related conditions. There are different types of atrial fibrillation, including:  · Paroxysmal atrial fibrillation. This type starts suddenly, and it usually stops on its own shortly after it starts.  · Persistent atrial fibrillation. This type often lasts longer than a week. It may stop on its own or with treatment.  · Long-lasting persistent atrial fibrillation. This type lasts longer than 12 months.  · Permanent atrial fibrillation. This type does not go away.  Talk with your health care provider to learn about the type of atrial fibrillation that you have.  What are the causes?  This condition is caused by some heart-related conditions or procedures, including:  · A heart attack.  · Coronary artery disease.  · Heart failure.  · Heart valve conditions.  · High blood pressure.  · Inflammation of the sac that surrounds the heart (pericarditis).  · Heart surgery.  · Certain heart rhythm disorders, such as Esposito-Parkinson-White syndrome.  Other causes include:  · Pneumonia.  · Obstructive sleep apnea.  · Blockage of an artery in the lungs (pulmonary embolism, or PE).  · Lung cancer.  · Chronic lung disease.  · Thyroid problems, especially if the thyroid is overactive (hyperthyroidism).  · Caffeine.  · Excessive alcohol use or illegal drug use.  · Use of some medicines, including certain decongestants and diet pills.  Sometimes, the cause cannot be found.  What increases the risk?  This condition is more likely to develop in:  · People who are older in age.  · People who smoke.  · People who have diabetes mellitus.  · People who are overweight (obese).  · Athletes  who exercise vigorously.  What are the signs or symptoms?  Symptoms of this condition include:  · A feeling that your heart is beating rapidly or irregularly.  · A feeling of discomfort or pain in your chest.  · Shortness of breath.  · Sudden light-headedness or weakness.  · Getting tired easily during exercise.  In some cases, there are no symptoms.  How is this diagnosed?  Your health care provider may be able to detect atrial fibrillation when taking your pulse. If detected, this condition may be diagnosed with:  · An electrocardiogram (ECG).  · A Holter monitor test that records your heartbeat patterns over a 24-hour period.  · Transthoracic echocardiogram (TTE) to evaluate how blood flows through your heart.  · Transesophageal echocardiogram (HEATHER) to view more detailed images of your heart.  · A stress test.  · Imaging tests, such as a CT scan or chest X-ray.  · Blood tests.  How is this treated?  The main goals of treatment are to prevent blood clots from forming and to keep your heart beating at a normal rate and rhythm. The type of treatment that you receive depends on many factors, such as your underlying medical conditions and how you feel when you are experiencing atrial fibrillation.  This condition may be treated with:  · Medicine to slow down the heart rate, bring the heart’s rhythm back to normal, or prevent clots from forming.  · Electrical cardioversion. This is a procedure that resets your heart’s rhythm by delivering a controlled, low-energy shock to the heart through your skin.  · Different types of ablation, such as catheter ablation, catheter ablation with pacemaker, or surgical ablation. These procedures destroy the heart tissues that send abnormal signals. When the pacemaker is used, it is placed under your skin to help your heart beat in a regular rhythm.  Follow these instructions at home:  · Take over-the counter and prescription medicines only as told by your health care provider.  · If  your health care provider prescribed a blood-thinning medicine (anticoagulant), take it exactly as told. Taking too much blood-thinning medicine can cause bleeding. If you do not take enough blood-thinning medicine, you will not have the protection that you need against stroke and other problems.  · Do not use tobacco products, including cigarettes, chewing tobacco, and e-cigarettes. If you need help quitting, ask your health care provider.  · If you have obstructive sleep apnea, manage your condition as told by your health care provider.  · Do not drink alcohol.  · Do not drink beverages that contain caffeine, such as coffee, soda, and tea.  · Maintain a healthy weight. Do not use diet pills unless your health care provider approves. Diet pills may make heart problems worse.  · Follow diet instructions as told by your health care provider.  · Exercise regularly as told by your health care provider.  · Keep all follow-up visits as told by your health care provider. This is important.  How is this prevented?  · Avoid drinking beverages that contain caffeine or alcohol.  · Avoid certain medicines, especially medicines that are used for breathing problems.  · Avoid certain herbs and herbal medicines, such as those that contain ephedra or ginseng.  · Do not use illegal drugs, such as cocaine and amphetamines.  · Do not smoke.  · Manage your high blood pressure.  Contact a health care provider if:  · You notice a change in the rate, rhythm, or strength of your heartbeat.  · You are taking an anticoagulant and you notice increased bruising.  · You tire more easily when you exercise or exert yourself.  Get help right away if:  · You have chest pain, abdominal pain, sweating, or weakness.  · You feel nauseous.  · You notice blood in your vomit, bowel movement, or urine.  · You have shortness of breath.  · You suddenly have swollen feet and ankles.  · You feel dizzy.  · You have sudden weakness or numbness of the face, arm,  or leg, especially on one side of the body.  · You have trouble speaking, trouble understanding, or both (aphasia).  · Your face or your eyelid droops on one side.  These symptoms may represent a serious problem that is an emergency. Do not wait to see if the symptoms will go away. Get medical help right away. Call your local emergency services (911 in the [place] Do not drive yourself to the hospital.   This information is not intended to replace advice given to you by your health care provider. Make sure you discuss any questions you have with your health care provider.  Document Released: 12/18/2006 Document Revised: 04/26/2017 Document Reviewed: 04/13/2016  [employer] Interactive Patient Education © 2017 [employer]

## 2018-04-17 NOTE — CONSULTS
CARDIOLOGY INITIAL NOTE    Referring Physician Dr Crews    Chief Complaint: atrial fibrillation       History of Present Illness: Nicholas Boykin is a 43 y.o. male with past medical history of palpitation on metoprolol tartrate, recently taking only on pill per day, due to bradycardia presents to the Emergency Department for palpitations started on Sunday evening associated with chest pressure, dizziness and shortness of breath. Patient following Dr Gtz    ROS:   Review of Systems   Constitutional: Negative for chills and fever.   HENT: Negative for hearing loss.    Respiratory: Positive for shortness of breath. Negative for cough.    Cardiovascular: Positive for chest pain and palpitations. Negative for leg swelling and PND.   Gastrointestinal: Negative for nausea and vomiting.   Genitourinary: Negative for frequency and urgency.   Musculoskeletal: Negative for back pain and neck pain.   Skin: Negative for rash.   Neurological: Positive for dizziness. Negative for sensory change, speech change, seizures, loss of consciousness and headaches.         Past Medical History:   Past Medical History:   Diagnosis Date   • Hyperlipidemia    • Paroxysmal atrial fibrillation (CMS-HCC)          Past Surgical History:  History reviewed. No pertinent surgical history.      Current Outpatient Medications:  Home Medications     Reviewed by Ana Rodríguez (Pharmacy Tech) on 04/17/18 at 0943  Med List Status: Complete   Medication Last Dose Status   aspirin (ASA) 325 MG Tab 4/17/2018 Active   docosahexanoic acid (OMEGA 3 FA) 1000 MG CAPS 4/16/2018 Active   Ginseng (GIN-ZING PO) 4/16/2018 Active   metoprolol (LOPRESSOR) 25 MG Tab 4/17/2018 Active                  Medication Allergy/Sensitivities:  No Known Allergies      Family History:  Family History   Problem Relation Age of Onset   • Heart Disease Maternal Grandmother          Social History:  Social History     Social History   • Marital status:      Spouse  "name: N/A   • Number of children: N/A   • Years of education: N/A     Occupational History   • Not on file.     Social History Main Topics   • Smoking status: Former Smoker     Years: 6.00     Quit date: 2006   • Smokeless tobacco: Current User     Types: Chew   • Alcohol use 2.5 oz/week     5 Cans of beer per week   • Drug use: No   • Sexual activity: Not on file     Other Topics Concern   • Not on file     Social History Narrative   • No narrative on file         Physical Exam     Vitals:    18 0809 18 0810 18 0902 18 0930   BP:       Pulse: (!) 121 (!) 57 (!) 53 (!) 57   Resp:       Temp:       TempSrc:       SpO2: 97% 97% 90% 99%   Weight:       Height:         Body mass index is 30.67 kg/m².  BP (!) 88/55   Pulse (!) 57   Temp (!) 35.7 °C (96.3 °F)   Resp 15   Ht 1.727 m (5' 8\")   Wt 91.5 kg (201 lb 11.5 oz)   SpO2 99%   BMI 30.67 kg/m²   O2 therapy: Pulse Oximetry: 99 %    Physical Exam   Constitutional: He is oriented to person, place, and time and well-developed, well-nourished, and in no distress. No distress.   HENT:   Head: Normocephalic and atraumatic.   Neck: No JVD present.   Cardiovascular: Normal rate.  Exam reveals no gallop and no friction rub.    No murmur heard.  Pulmonary/Chest: Effort normal. No stridor. No respiratory distress. He has no wheezes. He has no rales.   Abdominal: He exhibits no distension. There is no tenderness. There is no rebound and no guarding.   Neurological: He is alert and oriented to person, place, and time.   Skin: He is not diaphoretic.         Lab Data Review:  Recent Results (from the past 24 hour(s))   EKG (NOW)    Collection Time: 18  5:53 AM   Result Value Ref Range    Report       [facility] Emergency Dept.    Test Date:  2018  Pt Name:    DEBBY PAGAN                 Department: ER  MRN:        [id number]                      Room:  Gender:     Male                         Technician: [id number]  :     "    1975                   Requested By:ER TRIAGE PROTOCOL  Order #:    [id number]                    Reading MD: CALISTA CORONA    Measurements  Intervals                                Axis  Rate:       125                          P:  LA:                                      QRS:        -1  QRSD:       96                           T:          97  QT:         300  QTc:        433    Interpretive Statements  ATRIAL FIBRILLATION, V-RATE 121-134  MULTIPLE PREMATURE COMPLEXES, VENT & SUPRAVEN  RSR' IN V1 OR V2, RIGHT VCD OR RVH  Compared to ECG 02/27/2018 08:48:02  Right ventricular hypertrophy now present  RSR' in V1 or V2 now present  Sinus rhythm no longer present  T-wave abnormality no longer present    Electronically Signed On 4-  6:42:26 PDT by CALISTA CORONA     CBC w/ Differential    Collection Time: 04/17/18  6:13 AM   Result Value Ref Range    WBC 12.4 (H) 4.8 - 10.8 K/uL    RBC 5.56 4.70 - 6.10 M/uL    Hemoglobin 16.8 14.0 - 18.0 g/dL    Hematocrit 50.8 42.0 - 52.0 %    MCV 91.4 81.4 - 97.8 fL    MCH 30.2 27.0 - 33.0 pg    MCHC 33.1 (L) 33.7 - 35.3 g/dL    RDW 42.4 35.9 - 50.0 fL    Platelet Count 388 164 - 446 K/uL    MPV 9.5 9.0 - 12.9 fL    Nucleated RBC 0.00 /100 WBC    NRBC (Absolute) 0.00 K/uL    Neutrophils-Polys 44.30 44.00 - 72.00 %    Lymphocytes 49.60 (H) 22.00 - 41.00 %    Monocytes 4.30 0.00 - 13.40 %    Eosinophils 0.90 0.00 - 6.90 %    Basophils 0.00 0.00 - 1.80 %    Neutrophils (Absolute) 5.60 1.82 - 7.42 K/uL    Lymphs (Absolute) 6.15 (H) 1.00 - 4.80 K/uL    Monos (Absolute) 0.53 0.00 - 0.85 K/uL    Eos (Absolute) 0.11 0.00 - 0.51 K/uL    Baso (Absolute) 0.00 0.00 - 0.12 K/uL   Basic Metabolic Panel (BMP)    Collection Time: 04/17/18  6:13 AM   Result Value Ref Range    Sodium 137 135 - 145 mmol/L    Potassium 5.0 3.6 - 5.5 mmol/L    Chloride 105 96 - 112 mmol/L    Co2 23 20 - 33 mmol/L    Glucose 155 (H) 65 - 99 mg/dL    Bun 17 8 - 22 mg/dL    Creatinine 1.08 0.50 - 1.40 mg/dL     Calcium 9.6 8.5 - 10.5 mg/dL    Anion Gap 9.0 0.0 - 11.9   Troponin STAT    Collection Time: 18  6:13 AM   Result Value Ref Range    Troponin I <0.01 0.00 - 0.04 ng/mL   ESTIMATED GFR    Collection Time: 18  6:13 AM   Result Value Ref Range    GFR If African American >60 >60 mL/min/1.73 m 2    GFR If Non African American >60 >60 mL/min/1.73 m 2   DIFFERENTIAL MANUAL    Collection Time: 18  6:13 AM   Result Value Ref Range    Bands-Stabs 0.90 0.00 - 10.00 %    Manual Diff Status PERFORMED    PERIPHERAL SMEAR REVIEW    Collection Time: 18  6:13 AM   Result Value Ref Range    Peripheral Smear Review see below    PLATELET ESTIMATE    Collection Time: 18  6:13 AM   Result Value Ref Range    Plt Estimation Normal    MORPHOLOGY    Collection Time: 18  6:13 AM   Result Value Ref Range    RBC Morphology Present     Reactive Lymphocytes Few    EKG (ER)    Collection Time: 18  7:45 AM   Result Value Ref Range    Report       [facility]    Test Date:  2018  Pt Name:    DEBBY PAGAN                 Department: ER  MRN:        [id number]                      Room:        12  Gender:     Male                         Technician: [id number]  :        1975                   Requested By:CALISTA CORONA  Order #:    [id number]                    Reading MD:    Measurements  Intervals                                Axis  Rate:       125                          P:  SD:                                      QRS:        -6  QRSD:       88                           T:          7  QT:         308  QTc:        445    Interpretive Statements  JUNCTIONAL TACHYCARDIA  Compared to ECG 2018 05:53:47  Junctional tachycardia now present  Atrial fibrillation no longer present  Right ventricular hypertrophy no longer present         Imaging/Procedures Review:    ndependant Imaging Review: Completed  DX-CHEST-PORTABLE (1 VIEW)   Final Result      No acute  cardiopulmonary findings.            Assessment/Plan       # Paroxysmal atrial fibrillation   patient on short-acting metoprolol recently decrease the dose from twice to one time daily started to develop palpitation secondary to atrial fibrillation     Plan  No need for an anticoagulation low [id number] score 0  Started flecainide 50 mg bid   Ok to dc and follow up with outpatient cardiology   Switched to metoprolol succinate 25 mg     Thank you for this interesting consult.     Kaushik Davey M.D.  PGY 2  Attending addendum/additions to follow.

## 2018-04-17 NOTE — ED NOTES
Med Rec complete per PT and RX bottles (returned) at bedside   Allergies Reviewed  No ABX in the last 30 days  Ok per Pt to discuss medications with visitor/s present

## 2018-04-17 NOTE — ED NOTES
DC instructions and 2 written prescriptions given to pt; verbalized understanding. DC'd amb w/ friend.

## 2018-04-26 LAB — EKG IMPRESSION: NORMAL
